# Patient Record
Sex: FEMALE | Race: WHITE | NOT HISPANIC OR LATINO | Employment: OTHER | URBAN - METROPOLITAN AREA
[De-identification: names, ages, dates, MRNs, and addresses within clinical notes are randomized per-mention and may not be internally consistent; named-entity substitution may affect disease eponyms.]

---

## 2017-04-20 ENCOUNTER — TREATMENT (OUTPATIENT)
Dept: PHYSICAL THERAPY | Facility: CLINIC | Age: 62
End: 2017-04-20

## 2017-04-20 PROCEDURE — PTSPVT PR CUSTOM PT TREATMENT OF SELF PAY PATIENT: Performed by: PHYSICAL THERAPIST

## 2019-03-19 PROBLEM — K21.9 GASTROESOPHAGEAL REFLUX DISEASE WITHOUT ESOPHAGITIS: Status: ACTIVE | Noted: 2019-03-19

## 2019-03-19 PROBLEM — J45.909 MILD ASTHMA: Status: ACTIVE | Noted: 2019-03-19

## 2019-03-19 PROBLEM — J30.81 ALLERGIC RHINITIS DUE TO ANIMAL DANDER: Status: ACTIVE | Noted: 2019-03-19

## 2019-03-19 PROBLEM — J30.9 ALLERGIC RHINITIS: Status: ACTIVE | Noted: 2019-03-19

## 2019-03-19 PROBLEM — G89.29 CHRONIC THORACIC BACK PAIN: Status: ACTIVE | Noted: 2019-03-19

## 2019-03-19 PROBLEM — E66.9 SIMPLE OBESITY: Status: ACTIVE | Noted: 2019-03-19

## 2019-03-19 PROBLEM — M54.6 CHRONIC THORACIC BACK PAIN: Status: ACTIVE | Noted: 2019-03-19

## 2019-04-01 ENCOUNTER — OFFICE VISIT (OUTPATIENT)
Dept: INTERNAL MEDICINE | Facility: CLINIC | Age: 64
End: 2019-04-01

## 2019-04-01 VITALS
SYSTOLIC BLOOD PRESSURE: 128 MMHG | DIASTOLIC BLOOD PRESSURE: 84 MMHG | HEIGHT: 64 IN | HEART RATE: 80 BPM | WEIGHT: 186 LBS | BODY MASS INDEX: 31.76 KG/M2

## 2019-04-01 DIAGNOSIS — Z13.228 SCREENING FOR ENDOCRINE, METABOLIC, AND IMMUNITY DISORDER: ICD-10-CM

## 2019-04-01 DIAGNOSIS — Z13.0 SCREENING FOR ENDOCRINE, METABOLIC, AND IMMUNITY DISORDER: ICD-10-CM

## 2019-04-01 DIAGNOSIS — K21.9 GASTROESOPHAGEAL REFLUX DISEASE WITHOUT ESOPHAGITIS: ICD-10-CM

## 2019-04-01 DIAGNOSIS — E66.9 SIMPLE OBESITY: ICD-10-CM

## 2019-04-01 DIAGNOSIS — Z13.29 SCREENING FOR ENDOCRINE, METABOLIC, AND IMMUNITY DISORDER: ICD-10-CM

## 2019-04-01 DIAGNOSIS — E78.2 MIXED HYPERLIPIDEMIA: ICD-10-CM

## 2019-04-01 DIAGNOSIS — Z00.00 PREVENTATIVE HEALTH CARE: Primary | ICD-10-CM

## 2019-04-01 DIAGNOSIS — Z13.0 SCREENING FOR IRON DEFICIENCY ANEMIA: ICD-10-CM

## 2019-04-01 DIAGNOSIS — J30.89 NON-SEASONAL ALLERGIC RHINITIS DUE TO OTHER ALLERGIC TRIGGER: ICD-10-CM

## 2019-04-01 PROCEDURE — 99396 PREV VISIT EST AGE 40-64: CPT | Performed by: INTERNAL MEDICINE

## 2019-04-01 RX ORDER — LORATADINE 10 MG/1
10 TABLET ORAL EVERY OTHER DAY
COMMUNITY

## 2019-04-01 RX ORDER — ESTRADIOL AND NORETHINDRONE ACETATE 1; .5 MG/1; MG/1
1 TABLET ORAL EVERY OTHER DAY
COMMUNITY
Start: 2019-02-12 | End: 2020-07-14

## 2019-04-01 RX ORDER — CELECOXIB 50 MG/1
1 CAPSULE ORAL DAILY
COMMUNITY
Start: 2019-02-12 | End: 2019-04-01 | Stop reason: SDUPTHER

## 2019-04-01 RX ORDER — CELECOXIB 50 MG/1
50 CAPSULE ORAL 2 TIMES DAILY
Qty: 60 CAPSULE | Refills: 11 | Status: SHIPPED | OUTPATIENT
Start: 2019-04-01 | End: 2020-04-22

## 2019-04-01 RX ORDER — PSEUDOEPHEDRINE HCL 30 MG
30 TABLET ORAL EVERY 4 HOURS PRN
COMMUNITY
End: 2022-07-18

## 2019-04-01 NOTE — PROGRESS NOTES
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  She comes in for follow-up of her asthma, allergies, hyperlipidemia, GERD, obesity and for annual examination.  She feels well and has no complaints.  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  She does not exercise otherwise, but feels her diet is healthy.  Chronic Conditions:      Patient Active Problem List   Diagnosis   • Mild asthma   • Allergic rhinitis due to animal dander   • Chronic thoracic back pain   • Gastroesophageal reflux disease without esophagitis   • Simple obesity   • Allergic rhinitis        Past Medical History:   Diagnosis Date   • Chest pain     cardiac catheterization 2006   • Colonic polyp    • Compression fx, thoracic spine (CMS/HCC) 2009    T9       Past Surgical History:   Procedure Laterality Date   • CARDIAC CATHETERIZATION  2006    Chest pain       Family History   Problem Relation Age of Onset   • Colon cancer Mother    • Hypertension Mother    • Prostate cancer Father        Social History     Socioeconomic History   • Marital status:      Spouse name: Not on file   • Number of children: Not on file   • Years of education: Not on file   • Highest education level: Not on file   Tobacco Use   • Smoking status: Never Smoker       Allergies   Allergen Reactions   • Lasix [Furosemide] Hives     Pt states she has never taken lasix   • Latex Other (See Comments)     unknown         Current Outpatient Medications:   •  celecoxib (CeleBREX) 50 MG capsule, Take 1 capsule by mouth Daily., Disp: , Rfl:   •  estradiol-norethindrone (ACTIVELLA) 1-0.5 MG per tablet, Take 1 tablet by mouth Every Other Day., Disp: , Rfl:   •  loratadine (CLARITIN) 10 MG tablet, Take 10 mg by mouth Daily., Disp: , Rfl:   •  Nutritional Supplements  "(ANTI-INFLAMMATORY ENZYME PO), Take 1 tablet by mouth Daily., Disp: , Rfl:   •  pseudoephedrine (SUDAFED) 30 MG tablet, Take 30 mg by mouth Every 4 (Four) Hours As Needed for Congestion., Disp: , Rfl:     Immunization History   Administered Date(s) Administered   • Flu Vaccine Quad PF >18YRS 10/23/2018   • Hepatitis A 10/01/2018   • Tdap 07/11/2014   • Zostavax 11/14/2016        Health Maintenance Due   Topic Date Due   • ZOSTER VACCINE (2 of 3) 01/09/2017   • HEPATITIS C SCREENING  04/20/2017   • MAMMOGRAM  04/20/2017   • PAP SMEAR  04/20/2017   • ANNUAL PHYSICAL  11/21/2018        Review of Systems   Constitutional: Negative for chills, fatigue and fever.   HENT: Negative for congestion, ear pain and sinus pressure.    Respiratory: Negative for cough, chest tightness, shortness of breath and wheezing.    Cardiovascular: Negative for chest pain and palpitations.   Gastrointestinal: Negative for abdominal pain, blood in stool and constipation.   Skin: Negative for color change.   Allergic/Immunologic: Negative for environmental allergies.   Neurological: Negative for dizziness, speech difficulty and headache.   Psychiatric/Behavioral: Negative for decreased concentration. The patient is not nervous/anxious.         Vital Signs  Vitals:    04/01/19 1054   BP: 128/84   BP Location: Right arm   Patient Position: Sitting   Cuff Size: Adult   Pulse: 80   Weight: 84.4 kg (186 lb)   Height: 161.3 cm (63.5\")       Physical Exam   Constitutional: She is oriented to person, place, and time. She appears well-developed and well-nourished. She appears overweight.   HENT:   Head: Normocephalic and atraumatic.   Right Ear: External ear normal.   Left Ear: External ear normal.   Nose: Nose normal.   Mouth/Throat: Oropharynx is clear and moist. No oropharyngeal exudate.   Eyes: Conjunctivae and EOM are normal. Pupils are equal, round, and reactive to light.   Neck: Normal range of motion. Neck supple. No JVD present. No thyromegaly " present.   Cardiovascular: Normal rate, regular rhythm, normal heart sounds and intact distal pulses. Exam reveals no gallop and no friction rub.   No murmur heard.  Pulmonary/Chest: Effort normal and breath sounds normal. No respiratory distress. She has no wheezes. She has no rales. She exhibits no tenderness.   Abdominal: Soft. Bowel sounds are normal. She exhibits no distension and no mass. There is no tenderness. There is no rebound and no guarding. No hernia.   Musculoskeletal: Normal range of motion. She exhibits no edema or tenderness.   Lymphadenopathy:     She has no cervical adenopathy.   Neurological: She is alert and oriented to person, place, and time. She displays normal reflexes. No cranial nerve deficit or sensory deficit. She exhibits normal muscle tone. Coordination normal.   Skin: Skin is warm and dry. No rash noted. No erythema.   Psychiatric: She has a normal mood and affect. Her behavior is normal. Judgment and thought content normal.   Nursing note and vitals reviewed.     Procedures      Assessment/Plan:    Preventive care, she remains in very good health with reasonably good lifestyle habits.  She is up-to-date on mammography and GYN care.  Colonoscopy done 4/12/17, follow-up at 5 years.  Myalgias and arthralgias are controlled with physical activity and Celebrex.    Obesity, BMI today is 32.4.  She is encouraged to improve diet by reducing simple carbohydrates and saturated fats, and remain active.    Allergic rhinitis, controlled on loratadine and Sudafed.    GERD, currently asymptomatic without acid reducer therapy.    Asthma, asymptomatic, controlled with treatment of allergies.    Labs  No results found for this or any previous visit.     Counseling was given to patient for the following topics: appropriate exercise 150 minutes per week, disease prevention and healthy eating habits.    Plan of care reviewed with patient at the conclusion of today's visit. Education was provided  regarding diagnosis, management, and any prescribed or recommended OTC medications.Patient verbalizes understanding of and agreement with management plan.         Daniel Ovalle MD

## 2019-12-11 ENCOUNTER — TELEPHONE (OUTPATIENT)
Dept: INTERNAL MEDICINE | Facility: CLINIC | Age: 64
End: 2019-12-11

## 2019-12-11 NOTE — TELEPHONE ENCOUNTER
Patient did not complete labs from 4/11/19  2 portal messages have been sent to remind patient   Can labs be canceled?

## 2020-04-22 RX ORDER — CELECOXIB 50 MG/1
CAPSULE ORAL
Qty: 60 CAPSULE | Refills: 1 | Status: SHIPPED | OUTPATIENT
Start: 2020-04-22 | End: 2020-08-28

## 2020-07-14 ENCOUNTER — TELEPHONE (OUTPATIENT)
Dept: INTERNAL MEDICINE | Facility: CLINIC | Age: 65
End: 2020-07-14

## 2020-07-14 ENCOUNTER — LAB (OUTPATIENT)
Dept: LAB | Facility: HOSPITAL | Age: 65
End: 2020-07-14

## 2020-07-14 ENCOUNTER — OFFICE VISIT (OUTPATIENT)
Dept: INTERNAL MEDICINE | Facility: CLINIC | Age: 65
End: 2020-07-14

## 2020-07-14 VITALS
WEIGHT: 187.2 LBS | TEMPERATURE: 97.1 F | DIASTOLIC BLOOD PRESSURE: 84 MMHG | SYSTOLIC BLOOD PRESSURE: 134 MMHG | BODY MASS INDEX: 31.96 KG/M2 | HEART RATE: 76 BPM | HEIGHT: 64 IN

## 2020-07-14 DIAGNOSIS — R53.83 OTHER FATIGUE: ICD-10-CM

## 2020-07-14 DIAGNOSIS — J45.20 MILD INTERMITTENT ASTHMA WITHOUT COMPLICATION: ICD-10-CM

## 2020-07-14 DIAGNOSIS — E78.2 MIXED HYPERLIPIDEMIA: ICD-10-CM

## 2020-07-14 DIAGNOSIS — K21.9 GASTROESOPHAGEAL REFLUX DISEASE WITHOUT ESOPHAGITIS: ICD-10-CM

## 2020-07-14 DIAGNOSIS — J30.1 SEASONAL ALLERGIC RHINITIS DUE TO POLLEN: ICD-10-CM

## 2020-07-14 DIAGNOSIS — Z00.00 PREVENTATIVE HEALTH CARE: Primary | ICD-10-CM

## 2020-07-14 LAB
ALBUMIN SERPL-MCNC: 4.2 G/DL (ref 3.5–5.2)
ALBUMIN/GLOB SERPL: 1.6 G/DL
ALP SERPL-CCNC: 88 U/L (ref 39–117)
ALT SERPL W P-5'-P-CCNC: 22 U/L (ref 1–33)
ANION GAP SERPL CALCULATED.3IONS-SCNC: 10 MMOL/L (ref 5–15)
AST SERPL-CCNC: 20 U/L (ref 1–32)
BASOPHILS # BLD AUTO: 0.04 10*3/MM3 (ref 0–0.2)
BASOPHILS NFR BLD AUTO: 0.5 % (ref 0–1.5)
BILIRUB SERPL-MCNC: 0.6 MG/DL (ref 0–1.2)
BUN SERPL-MCNC: 11 MG/DL (ref 8–23)
BUN/CREAT SERPL: 16.4 (ref 7–25)
CALCIUM SPEC-SCNC: 9.4 MG/DL (ref 8.6–10.5)
CHLORIDE SERPL-SCNC: 105 MMOL/L (ref 98–107)
CHOLEST SERPL-MCNC: 183 MG/DL (ref 0–200)
CO2 SERPL-SCNC: 27 MMOL/L (ref 22–29)
CREAT SERPL-MCNC: 0.67 MG/DL (ref 0.57–1)
DEPRECATED RDW RBC AUTO: 38.3 FL (ref 37–54)
EOSINOPHIL # BLD AUTO: 0.15 10*3/MM3 (ref 0–0.4)
EOSINOPHIL NFR BLD AUTO: 2.1 % (ref 0.3–6.2)
ERYTHROCYTE [DISTWIDTH] IN BLOOD BY AUTOMATED COUNT: 12.6 % (ref 12.3–15.4)
GFR SERPL CREATININE-BSD FRML MDRD: 88 ML/MIN/1.73
GLOBULIN UR ELPH-MCNC: 2.7 GM/DL
GLUCOSE SERPL-MCNC: 92 MG/DL (ref 65–99)
HCT VFR BLD AUTO: 41.7 % (ref 34–46.6)
HDLC SERPL-MCNC: 51 MG/DL (ref 40–60)
HGB BLD-MCNC: 14.5 G/DL (ref 12–15.9)
IMM GRANULOCYTES # BLD AUTO: 0.01 10*3/MM3 (ref 0–0.05)
IMM GRANULOCYTES NFR BLD AUTO: 0.1 % (ref 0–0.5)
LDLC SERPL CALC-MCNC: 110 MG/DL (ref 0–100)
LDLC/HDLC SERPL: 2.16 {RATIO}
LYMPHOCYTES # BLD AUTO: 1.83 10*3/MM3 (ref 0.7–3.1)
LYMPHOCYTES NFR BLD AUTO: 25.1 % (ref 19.6–45.3)
MCH RBC QN AUTO: 29.1 PG (ref 26.6–33)
MCHC RBC AUTO-ENTMCNC: 34.8 G/DL (ref 31.5–35.7)
MCV RBC AUTO: 83.6 FL (ref 79–97)
MONOCYTES # BLD AUTO: 0.5 10*3/MM3 (ref 0.1–0.9)
MONOCYTES NFR BLD AUTO: 6.9 % (ref 5–12)
NEUTROPHILS NFR BLD AUTO: 4.75 10*3/MM3 (ref 1.7–7)
NEUTROPHILS NFR BLD AUTO: 65.3 % (ref 42.7–76)
NRBC BLD AUTO-RTO: 0 /100 WBC (ref 0–0.2)
PLATELET # BLD AUTO: 271 10*3/MM3 (ref 140–450)
PMV BLD AUTO: 9.9 FL (ref 6–12)
POTASSIUM SERPL-SCNC: 4 MMOL/L (ref 3.5–5.2)
PROT SERPL-MCNC: 6.9 G/DL (ref 6–8.5)
RBC # BLD AUTO: 4.99 10*6/MM3 (ref 3.77–5.28)
SODIUM SERPL-SCNC: 142 MMOL/L (ref 136–145)
TRIGL SERPL-MCNC: 109 MG/DL (ref 0–150)
TSH SERPL DL<=0.05 MIU/L-ACNC: 1.96 UIU/ML (ref 0.27–4.2)
VLDLC SERPL-MCNC: 21.8 MG/DL (ref 5–40)
WBC # BLD AUTO: 7.28 10*3/MM3 (ref 3.4–10.8)

## 2020-07-14 PROCEDURE — 80053 COMPREHEN METABOLIC PANEL: CPT

## 2020-07-14 PROCEDURE — G0402 INITIAL PREVENTIVE EXAM: HCPCS | Performed by: INTERNAL MEDICINE

## 2020-07-14 PROCEDURE — 85025 COMPLETE CBC W/AUTO DIFF WBC: CPT

## 2020-07-14 PROCEDURE — 84443 ASSAY THYROID STIM HORMONE: CPT

## 2020-07-14 PROCEDURE — 90670 PCV13 VACCINE IM: CPT | Performed by: INTERNAL MEDICINE

## 2020-07-14 PROCEDURE — 80061 LIPID PANEL: CPT

## 2020-07-14 PROCEDURE — G0009 ADMIN PNEUMOCOCCAL VACCINE: HCPCS | Performed by: INTERNAL MEDICINE

## 2020-07-14 RX ORDER — ESTRADIOL AND NORETHINDRONE ACETATE .5; .1 MG/1; MG/1
1 TABLET ORAL EVERY OTHER DAY
COMMUNITY

## 2020-07-14 RX ORDER — ASPIRIN 81 MG/1
81 TABLET ORAL DAILY
COMMUNITY

## 2020-07-14 NOTE — PROGRESS NOTES
QUICK REFERENCE INFORMATION:  The ABCs of the Annual Wellness Visit    Welcome to Medicare Visit    HEALTH RISK ASSESSMENT    1955    Recent Hospitalizations:  No hospitalization(s) within the last year..      Current Medical Providers:  Patient Care Team:  Daniel Ovalle MD as PCP - General      Smoking Status:  Social History     Tobacco Use   Smoking Status Never Smoker       Alcohol Consumption:  Social History     Substance and Sexual Activity   Alcohol Use Not on file       Depression Screen:   PHQ-2/PHQ-9 Depression Screening 7/14/2020   Little interest or pleasure in doing things 0   Feeling down, depressed, or hopeless 1   Total Score 1       Health Habits and Functional and Cognitive Screening:  Functional & Cognitive Status 7/14/2020   Do you have difficulty preparing food and eating? No   Do you have difficulty bathing yourself, getting dressed or grooming yourself? No   Do you have difficulty using the toilet? No   Do you have difficulty moving around from place to place? No   Do you have trouble with steps or getting out of a bed or a chair? No   Current Diet Unhealthy Diet   Dental Exam Up to date   Eye Exam Not up to date   Exercise (times per week) 7 times per week   Current Exercise Activities Include (No Data)   Do you need help using the phone?  No   Are you deaf or do you have serious difficulty hearing?  No   Do you need help with transportation? No   Do you need help shopping? No   Do you need help preparing meals?  No   Do you need help with housework?  No   Do you need help with laundry? No   Do you need help taking your medications? No   Do you need help managing money? No   Do you ever drive or ride in a car without wearing a seat belt? No   Have you felt unusual stress, anger or loneliness in the last month? Yes   Who do you live with? Spouse   If you need help, do you have trouble finding someone available to you? No   Have you been bothered in the last four weeks by sexual  problems? No   Do you have difficulty concentrating, remembering or making decisions? No       Fall Risk Screen:  ARTURO Fall Risk Assessment was completed, and patient is at MODERATE risk for falls. Assessment completed on:7/14/2020    ACE III MINI        Does the patient have evidence of cognitive impairment? Yes    Aspirin use counseling? Does not need ASA (and currently is not on it)      Recent Lab Results:  CMP:     HbA1c:  No results found for: HGBA1C  Microalbumin:  No results found for: MICROALBUR, POCMALB, POCCREAT  Lipid Panel  No results found for: CHOL, TRIG, HDL, LDL, AST, ALT    Visual Acuity:   Visual Acuity Screening    Right eye Left eye Both eyes   Without correction:      With correction: 20/25 20/25 20/25       Age-appropriate Screening Schedule:  Refer to the list below for future screening recommendations based on patient's age, sex and/or medical conditions. Orders for these recommended tests are listed in the plan section. The patient has been provided with a written plan.    Health Maintenance   Topic Date Due   • ZOSTER VACCINE (2 of 3) 01/09/2017   • PAP SMEAR  04/20/2017   • LIPID PANEL  04/01/2019   • INFLUENZA VACCINE  08/01/2020   • MAMMOGRAM  03/10/2022   • TDAP/TD VACCINES (2 - Td) 07/11/2024   • COLONOSCOPY  04/12/2027        Subjective   History of Present Illness    Luma Moon is a 65 y.o. female an established patient presenting for a Welcome to Medicare Visit.     CHRONIC CONDITIONS    The following portions of the patient's history were reviewed and updated as appropriate: allergies, current medications, past family history, past medical history, past social history, past surgical history and problem list.    Outpatient Medications Prior to Visit   Medication Sig Dispense Refill   • aspirin 81 MG EC tablet Take 81 mg by mouth Daily.     • celecoxib (CeleBREX) 50 MG capsule TAKE ONE CAPSULE BY MOUTH TWICE DAILY  60 capsule 1   • cimetidine (TAGAMET) 200 MG tablet Take 200  mg by mouth 4 (Four) Times a Day.     • Estradiol-Norethindrone Acet 0.5-0.1 MG per tablet Take 1 tablet by mouth Every Other Day.     • loratadine (CLARITIN) 10 MG tablet Take 10 mg by mouth Daily.     • Methylcellulose, Laxative, (CITRUCEL PO) Take 3 tablets by mouth Daily.     • Nutritional Supplements (ANTI-INFLAMMATORY ENZYME PO) Take 1 tablet by mouth Daily.     • pseudoephedrine (SUDAFED) 30 MG tablet Take 30 mg by mouth Every 4 (Four) Hours As Needed for Congestion.     • estradiol-norethindrone (ACTIVELLA) 1-0.5 MG per tablet Take 1 tablet by mouth Every Other Day.       No facility-administered medications prior to visit.        Patient Active Problem List   Diagnosis   • Mild asthma   • Allergic rhinitis due to animal dander   • Chronic thoracic back pain   • Gastroesophageal reflux disease without esophagitis   • Simple obesity   • Allergic rhinitis       Advance Care Planning:  ACP discussion was held with the patient during this visit. Patient has an advance directive (not in EMR), copy requested.    Identification of Risk Factors:  Risk factors include: Advance Directive Discussion  Cardiovascular risk.    Review of Systems   Constitutional: Negative for chills, fatigue and fever.   HENT: Negative for congestion, ear pain and sinus pressure.    Respiratory: Negative for cough, chest tightness, shortness of breath and wheezing.    Cardiovascular: Negative for chest pain and palpitations.   Gastrointestinal: Negative for abdominal pain, blood in stool and constipation.   Skin: Negative for color change.   Allergic/Immunologic: Negative for environmental allergies.   Neurological: Negative for dizziness, speech difficulty and headaches.   Psychiatric/Behavioral: Negative for confusion. The patient is not nervous/anxious.        Compared to one year ago, the patient feels her physical health is the same.  Compared to one year ago, the patient feels her mental health is the same.    Objective    Physical  "Exam   Constitutional: She is oriented to person, place, and time. She appears well-developed and well-nourished.   HENT:   Head: Normocephalic and atraumatic.   Right Ear: External ear normal.   Left Ear: External ear normal.   Nose: Nose normal.   Mouth/Throat: Oropharynx is clear and moist. No oropharyngeal exudate.   Eyes: Pupils are equal, round, and reactive to light. Conjunctivae and EOM are normal.   Neck: Normal range of motion. Neck supple. No JVD present. No thyromegaly present.   Cardiovascular: Normal rate, regular rhythm, normal heart sounds and intact distal pulses. Exam reveals no gallop and no friction rub.   No murmur heard.  Pulmonary/Chest: Effort normal and breath sounds normal. No respiratory distress. She has no wheezes. She has no rales.   Abdominal: Soft. Bowel sounds are normal. She exhibits no distension and no mass. There is no tenderness. There is no rebound and no guarding. No hernia.   Musculoskeletal: Normal range of motion. She exhibits no edema or tenderness.   Lymphadenopathy:     She has no cervical adenopathy.   Neurological: She is alert and oriented to person, place, and time. She displays normal reflexes. No cranial nerve deficit or sensory deficit. She exhibits normal muscle tone. Coordination normal.   Skin: Skin is warm and dry. No rash noted. No erythema.   Psychiatric: She has a normal mood and affect. Her behavior is normal. Judgment and thought content normal.   Nursing note and vitals reviewed.       Vitals:    07/14/20 1029   BP: 134/84   BP Location: Left arm   Patient Position: Sitting   Cuff Size: Adult   Pulse: 76   Temp: 97.1 °F (36.2 °C)   Weight: 84.9 kg (187 lb 3.2 oz)   Height: 161.3 cm (63.5\")   PainSc: 0-No pain       Patient's Body mass index is 32.64 kg/m². BMI is above normal parameters. Recommendations include: exercise counseling and nutrition counseling.      Procedure   Procedures       Assessment/Plan   Problem List Items Addressed This Visit        " Respiratory    Mild asthma    Allergic rhinitis    Relevant Medications    celecoxib (CeleBREX) 50 MG capsule       Digestive    Gastroesophageal reflux disease without esophagitis    Relevant Medications    cimetidine (TAGAMET) 200 MG tablet      Other Visit Diagnoses     Preventative health care    -  Primary    Mixed hyperlipidemia        Relevant Orders    Comprehensive Metabolic Panel    Lipid Panel    Other fatigue        Relevant Orders    CBC & Differential    TSH        Patient Self-Management and Personalized Health Advice  The patient has been provided with information about: diet, exercise, weight management and prevention of cardiac or vascular disease and preventive services including:   · Annual Wellness Visit (AWV).    Outpatient Encounter Medications as of 7/14/2020   Medication Sig Dispense Refill   • aspirin 81 MG EC tablet Take 81 mg by mouth Daily.     • celecoxib (CeleBREX) 50 MG capsule TAKE ONE CAPSULE BY MOUTH TWICE DAILY  60 capsule 1   • cimetidine (TAGAMET) 200 MG tablet Take 200 mg by mouth 4 (Four) Times a Day.     • Estradiol-Norethindrone Acet 0.5-0.1 MG per tablet Take 1 tablet by mouth Every Other Day.     • loratadine (CLARITIN) 10 MG tablet Take 10 mg by mouth Daily.     • Methylcellulose, Laxative, (CITRUCEL PO) Take 3 tablets by mouth Daily.     • Nutritional Supplements (ANTI-INFLAMMATORY ENZYME PO) Take 1 tablet by mouth Daily.     • pseudoephedrine (SUDAFED) 30 MG tablet Take 30 mg by mouth Every 4 (Four) Hours As Needed for Congestion.     • [DISCONTINUED] estradiol-norethindrone (ACTIVELLA) 1-0.5 MG per tablet Take 1 tablet by mouth Every Other Day.       No facility-administered encounter medications on file as of 7/14/2020.      Plan    Overall she remains in very good health with good lifestyle habits.  She is up-to-date on GYN care and mammography.  Colonoscopy performed 2017, due at 5 years.  BMI today is 32, discussed the importance of weight loss through  diet.    Lipid panel is pending, continue healthy diet and regular physical exercise.    Allergies are well controlled with loratadine.    Asthma is currently asymptomatic.    GE reflux is controlled with cimetidine.    Reviewed use of high risk medication in the elderly: yes  Reviewed for potential of harmful drug interactions in the elderly: yes    Follow Up:  Return in about 1 year (around 7/14/2021) for Medicare Wellness 1 visit.     There are no Patient Instructions on file for this visit.    An After Visit Summary and PPPS with all of these plans were given to the patient.

## 2020-08-28 RX ORDER — CELECOXIB 50 MG/1
CAPSULE ORAL
Qty: 60 CAPSULE | Refills: 5 | Status: SHIPPED | OUTPATIENT
Start: 2020-08-28 | End: 2022-01-03 | Stop reason: SDUPTHER

## 2020-10-07 PROCEDURE — U0003 INFECTIOUS AGENT DETECTION BY NUCLEIC ACID (DNA OR RNA); SEVERE ACUTE RESPIRATORY SYNDROME CORONAVIRUS 2 (SARS-COV-2) (CORONAVIRUS DISEASE [COVID-19]), AMPLIFIED PROBE TECHNIQUE, MAKING USE OF HIGH THROUGHPUT TECHNOLOGIES AS DESCRIBED BY CMS-2020-01-R: HCPCS | Performed by: NURSE PRACTITIONER

## 2021-04-06 ENCOUNTER — OFFICE VISIT (OUTPATIENT)
Dept: INTERNAL MEDICINE | Facility: CLINIC | Age: 66
End: 2021-04-06

## 2021-04-06 VITALS
BODY MASS INDEX: 33.73 KG/M2 | SYSTOLIC BLOOD PRESSURE: 124 MMHG | DIASTOLIC BLOOD PRESSURE: 78 MMHG | HEIGHT: 63 IN | TEMPERATURE: 97.3 F | WEIGHT: 190.4 LBS | HEART RATE: 72 BPM

## 2021-04-06 DIAGNOSIS — M25.561 CHRONIC PAIN OF RIGHT KNEE: Primary | ICD-10-CM

## 2021-04-06 DIAGNOSIS — G89.29 CHRONIC PAIN OF RIGHT KNEE: Primary | ICD-10-CM

## 2021-04-06 DIAGNOSIS — G70.00 OCULAR MYASTHENIA GRAVIS (HCC): ICD-10-CM

## 2021-04-06 PROCEDURE — 99213 OFFICE O/P EST LOW 20 MIN: CPT | Performed by: INTERNAL MEDICINE

## 2021-04-06 NOTE — PROGRESS NOTES
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  She is done her own stretching exercises and rested.  However she continues to have discomfort and feels that she might have a partially torn lateral collateral ligament with meniscus damage.  Initially she had severe impairment and used it knee brace, a walker and then a cane to walk.  After 12 weeks she could walk without joint pain.  Currently 4 months later she is still does not feel like her gait is normal.  Also she is experiencing fatigue and her eyes, late in the day feels she needs to close her eyes and is concerned she might have ocular myasthenia gravis.    Chronic Conditions:      Patient Active Problem List   Diagnosis   • Mild asthma   • Allergic rhinitis due to animal dander   • Chronic thoracic back pain   • Gastroesophageal reflux disease without esophagitis   • Simple obesity   • Allergic rhinitis        Past Medical History:   Diagnosis Date   • Chest pain     cardiac catheterization 2006   • Colonic polyp    • Compression fx, thoracic spine (CMS/HCC) 2009    T9       Past Surgical History:   Procedure Laterality Date   • CARDIAC CATHETERIZATION  2006    Chest pain       Family History   Problem Relation Age of Onset   • Colon cancer Mother    • Hypertension Mother    • Prostate cancer Father        Social History     Socioeconomic History   • Marital status:      Spouse name: Not on file   • Number of children: Not on file   • Years of education: Not on file   • Highest education level: Not on file   Tobacco Use   • Smoking status: Never Smoker   • Smokeless tobacco: Never Used       Allergies   Allergen Reactions   • Latex Other (See Comments)     unknown          Current Outpatient Medications:   •  aspirin 81 MG EC tablet, Take 81 mg by mouth Daily., Disp: , Rfl:   •  celecoxib (CeleBREX) 50 MG capsule, TAKE ONE CAPSULE BY MOUTH TWICE DAILY , Disp: 60 capsule, Rfl: 5  •  cimetidine (TAGAMET) 200 MG tablet, Take 200 mg by mouth Daily As Needed., Disp: , Rfl:   •  Estradiol-Norethindrone Acet 0.5-0.1 MG per tablet, Take 1 tablet by mouth Every Other Day., Disp: , Rfl:   •  loratadine (CLARITIN) 10 MG tablet, Take 10 mg by mouth Every Other Day., Disp: , Rfl:   •  Methylcellulose, Laxative, (CITRUCEL PO), Take 3 tablets by mouth Daily., Disp: , Rfl:   •  Nutritional Supplements (ANTI-INFLAMMATORY ENZYME PO), Take 1 tablet by mouth Daily., Disp: , Rfl:   •  pseudoephedrine (SUDAFED) 30 MG tablet, Take 30 mg by mouth Every 4 (Four) Hours As Needed for Congestion., Disp: , Rfl:     Review of Systems   Constitutional: Negative for activity change, appetite change, diaphoresis, fatigue, unexpected weight gain and unexpected weight loss.   HENT: Negative for hearing loss.    Eyes: Negative for visual disturbance.   Respiratory: Negative for chest tightness and shortness of breath.    Cardiovascular: Negative for chest pain, palpitations and leg swelling.   Gastrointestinal: Negative for abdominal pain, blood in stool, GERD and indigestion.   Endocrine: Negative for cold intolerance and heat intolerance.   Genitourinary: Negative for dysuria and hematuria.   Musculoskeletal: Negative for arthralgias and myalgias.   Skin: Negative for skin lesions.   Allergic/Immunologic: Positive for environmental allergies.   Neurological: Negative for tremors, seizures, syncope, speech difficulty, weakness, headache, memory problem and confusion.   Hematological: Does not bruise/bleed easily.   Psychiatric/Behavioral: Negative for sleep disturbance and depressed mood. The patient is not nervous/anxious.         Vital Signs  Vitals:    04/06/21 1040   BP: 124/78   BP Location: Right arm  "  Patient Position: Sitting   Cuff Size: Adult   Pulse: 72   Temp: 97.3 °F (36.3 °C)   Weight: 86.4 kg (190 lb 6.4 oz)   Height: 158.8 cm (62.52\")   PainSc:   6   PainLoc: Knee       Physical Exam  Vitals reviewed.   Constitutional:       Appearance: She is well-developed.   HENT:      Head: Normocephalic and atraumatic.   Cardiovascular:      Rate and Rhythm: Normal rate and regular rhythm.      Heart sounds: Normal heart sounds. No murmur heard.     Pulmonary:      Effort: Pulmonary effort is normal.      Breath sounds: Normal breath sounds.   Musculoskeletal:      Comments: Knees appear normal   Neurological:      Mental Status: She is alert and oriented to person, place, and time.          Procedures    ACE III MINI             Assessment/Plan:    Diagnoses and all orders for this visit:    1. Chronic pain of right knee (Primary)    2. Ocular myasthenia gravis (CMS/HCC)  -     Ambulatory Referral to Neurology    Plan    At this point I think physical therapy is the appropriate treatment.  Only if she continues to have discomfort after several weeks of therapy without any recommend imaging.    She is referred to neurology for evaluation of possible ocular myasthenia.      Plan of care reviewed with patient at the conclusion of today's visit. Education was provided regarding diagnosis, management, and any prescribed or recommended OTC medications.Patient verbalizes understanding of and agreement with management plan.         Daniel Ovalle MD           "

## 2021-04-20 ENCOUNTER — TELEPHONE (OUTPATIENT)
Dept: INTERNAL MEDICINE | Facility: CLINIC | Age: 66
End: 2021-04-20

## 2021-04-20 DIAGNOSIS — G89.29 CHRONIC PAIN OF RIGHT KNEE: Primary | ICD-10-CM

## 2021-04-20 DIAGNOSIS — M25.561 CHRONIC PAIN OF RIGHT KNEE: Primary | ICD-10-CM

## 2021-04-20 NOTE — TELEPHONE ENCOUNTER
TAN FROM [facility] CALLED AND STATED THAT PATIENT IS THERE AT THEIR FACILITY FOR PHYSICAL THERAPY FOR HER RIGHT KNEE.    THERE WAS NO REFERRAL PUT IN.  CAN A REFERRAL BE PUT IN SINCE PATIENT IS THERE NOW.    [facility] OFFICE NUMBER [phone]    FAX NUMBER [phone]

## 2021-07-14 ENCOUNTER — OFFICE VISIT (OUTPATIENT)
Dept: INTERNAL MEDICINE | Facility: CLINIC | Age: 66
End: 2021-07-14

## 2021-07-14 VITALS
BODY MASS INDEX: 32.44 KG/M2 | HEIGHT: 64 IN | OXYGEN SATURATION: 96 % | DIASTOLIC BLOOD PRESSURE: 82 MMHG | SYSTOLIC BLOOD PRESSURE: 114 MMHG | WEIGHT: 190 LBS | HEART RATE: 77 BPM | TEMPERATURE: 97.5 F

## 2021-07-14 DIAGNOSIS — Z00.00 PREVENTATIVE HEALTH CARE: Primary | ICD-10-CM

## 2021-07-14 DIAGNOSIS — Z11.59 ENCOUNTER FOR HEPATITIS C SCREENING TEST FOR LOW RISK PATIENT: ICD-10-CM

## 2021-07-14 DIAGNOSIS — J30.1 SEASONAL ALLERGIC RHINITIS DUE TO POLLEN: ICD-10-CM

## 2021-07-14 DIAGNOSIS — J45.20 MILD INTERMITTENT ASTHMA WITHOUT COMPLICATION: ICD-10-CM

## 2021-07-14 DIAGNOSIS — E78.2 MIXED HYPERLIPIDEMIA: ICD-10-CM

## 2021-07-14 PROCEDURE — 1125F AMNT PAIN NOTED PAIN PRSNT: CPT | Performed by: INTERNAL MEDICINE

## 2021-07-14 PROCEDURE — 1170F FXNL STATUS ASSESSED: CPT | Performed by: INTERNAL MEDICINE

## 2021-07-14 PROCEDURE — 1160F RVW MEDS BY RX/DR IN RCRD: CPT | Performed by: INTERNAL MEDICINE

## 2021-07-14 PROCEDURE — 90732 PPSV23 VACC 2 YRS+ SUBQ/IM: CPT | Performed by: INTERNAL MEDICINE

## 2021-07-14 PROCEDURE — G0438 PPPS, INITIAL VISIT: HCPCS | Performed by: INTERNAL MEDICINE

## 2021-07-14 PROCEDURE — 99397 PER PM REEVAL EST PAT 65+ YR: CPT | Performed by: INTERNAL MEDICINE

## 2021-07-14 PROCEDURE — G0009 ADMIN PNEUMOCOCCAL VACCINE: HCPCS | Performed by: INTERNAL MEDICINE

## 2021-07-14 NOTE — PROGRESS NOTES
QUICK REFERENCE INFORMATION:  The ABCs of the Annual Wellness Visit    Subsequent Medicare Wellness Visit    HEALTH RISK ASSESSMENT    1955    Recent Hospitalizations:  No hospitalization(s) within the last year..        Current Medical Providers:  Patient Care Team:  Dainel Ovalle MD as PCP - General        Smoking Status:  Social History     Tobacco Use   Smoking Status Never Smoker   Smokeless Tobacco Never Used       Alcohol Consumption:  Social History     Substance and Sexual Activity   Alcohol Use Yes    Comment: very rare        Depression Screen:   PHQ-2/PHQ-9 Depression Screening 7/14/2021   Little interest or pleasure in doing things 0   Feeling down, depressed, or hopeless 0   Total Score 0       Health Habits and Functional and Cognitive Screening:  Functional & Cognitive Status 7/14/2021   Do you have difficulty preparing food and eating? No   Do you have difficulty bathing yourself, getting dressed or grooming yourself? No   Do you have difficulty using the toilet? No   Do you have difficulty moving around from place to place? No   Do you have trouble with steps or getting out of a bed or a chair? No   Current Diet Unhealthy Diet   Dental Exam Up to date   Eye Exam Up to date   Exercise (times per week) 3 times per week   Current Exercises Include Other        Exercise Comment PT Exercises    Current Exercise Activities Include -   Do you need help using the phone?  No   Are you deaf or do you have serious difficulty hearing?  No   Do you need help with transportation? No   Do you need help shopping? No   Do you need help preparing meals?  No   Do you need help with housework?  No   Do you need help with laundry? No   Do you need help taking your medications? No   Do you need help managing money? No   Do you ever drive or ride in a car without wearing a seat belt? No   Have you felt unusual stress, anger or loneliness in the last month? No   Who do you live with? Spouse   If you need  help, do you have trouble finding someone available to you? No   Have you been bothered in the last four weeks by sexual problems? No   Do you have difficulty concentrating, remembering or making decisions? No       Fall Risk Screen:  ARTURO Fall Risk Assessment was completed, and patient is at MODERATE risk for falls. Assessment completed on:7/14/2021    ACE III MINI        Does the patient have evidence of cognitive impairment? No    Aspirin use counseling: Does not need ASA (and currently is not on it)    Recent Lab Results:  CMP:  Lab Results   Component Value Date    BUN 11 07/14/2020    CREATININE 0.67 07/14/2020    EGFRIFNONA 88 07/14/2020    BCR 16.4 07/14/2020     07/14/2020    K 4.0 07/14/2020    CO2 27.0 07/14/2020    CALCIUM 9.4 07/14/2020    ALBUMIN 4.20 07/14/2020    BILITOT 0.6 07/14/2020    ALKPHOS 88 07/14/2020    AST 20 07/14/2020    ALT 22 07/14/2020     HbA1c:  No results found for: HGBA1C  Microalbumin:  No results found for: MICROALBUR, POCMALB, POCCREAT  Lipid Panel  Lab Results   Component Value Date    CHOL 183 07/14/2020    TRIG 109 07/14/2020    HDL 51 07/14/2020     (H) 07/14/2020    AST 20 07/14/2020    ALT 22 07/14/2020       Visual Acuity:  No exam data present    Age-appropriate Screening Schedule:  Refer to the list below for future screening recommendations based on patient's age, sex and/or medical conditions. Orders for these recommended tests are listed in the plan section. The patient has been provided with a written plan.    Health Maintenance   Topic Date Due   • DXA SCAN  Never done   • ZOSTER VACCINE (2 of 3) 01/09/2017   • PAP SMEAR  Never done   • LIPID PANEL  07/14/2021   • INFLUENZA VACCINE  08/01/2021   • MAMMOGRAM  02/26/2023   • TDAP/TD VACCINES (2 - Td or Tdap) 07/11/2024        Subjective   History of Present Illness    Luma Moon is a 66 y.o. female who presents for a Subsequent Wellness Visit.    CHRONIC CONDITIONS    The following portions of the  patient's history were reviewed and updated as appropriate: allergies, current medications, past family history, past medical history, past social history, past surgical history and problem list.    Outpatient Medications Prior to Visit   Medication Sig Dispense Refill   • aspirin 81 MG EC tablet Take 81 mg by mouth Daily.     • celecoxib (CeleBREX) 50 MG capsule TAKE ONE CAPSULE BY MOUTH TWICE DAILY  (Patient taking differently: 1-2 daily) 60 capsule 5   • cimetidine (TAGAMET) 200 MG tablet Take 200 mg by mouth Daily As Needed.     • Estradiol-Norethindrone Acet 0.5-0.1 MG per tablet Take 1 tablet by mouth Every Other Day.     • loratadine (CLARITIN) 10 MG tablet Take 10 mg by mouth Every Other Day.     • Methylcellulose, Laxative, (CITRUCEL PO) Take 3 tablets by mouth Daily.     • Nutritional Supplements (ANTI-INFLAMMATORY ENZYME PO) Take 1 tablet by mouth Daily.     • Nutritional Supplements (NUTRITIONAL SUPPLEMENT PO) Take  by mouth. NAD   100 mg  BID     • pseudoephedrine (SUDAFED) 30 MG tablet Take 30 mg by mouth Every 4 (Four) Hours As Needed for Congestion.     • Triamcinolone Acetonide (NASACORT ALLERGY 24HR NA) 2 sprays into the nostril(s) as directed by provider Daily.       No facility-administered medications prior to visit.       Patient Active Problem List   Diagnosis   • Mild asthma   • Allergic rhinitis due to animal dander   • Chronic thoracic back pain   • Gastroesophageal reflux disease without esophagitis   • Simple obesity   • Allergic rhinitis       Advance Care Planning:  ACP discussion was held with the patient during this visit. Patient has an advance directive in EMR which is still valid.     Identification of Risk Factors:  Risk factors include: Advance Directive Discussion.    Review of Systems   Constitutional: Negative for chills, fatigue and fever.   HENT: Negative for congestion, ear pain and sinus pressure.    Respiratory: Negative for cough, chest tightness, shortness of breath and  wheezing.    Cardiovascular: Negative for chest pain and palpitations.   Gastrointestinal: Negative for abdominal pain, blood in stool and constipation.   Skin: Negative for color change.   Allergic/Immunologic: Negative for environmental allergies.   Neurological: Negative for dizziness, speech difficulty and headaches.   Psychiatric/Behavioral: Negative for confusion. The patient is not nervous/anxious.        Compared to one year ago, the patient feels her physical health is the same.  Compared to one year ago, the patient feels her mental health is the same.    Objective     Physical Exam  Vitals and nursing note reviewed.   Constitutional:       Appearance: She is well-developed.   HENT:      Head: Normocephalic and atraumatic.      Right Ear: External ear normal.      Left Ear: External ear normal.      Nose: Nose normal.      Mouth/Throat:      Pharynx: No oropharyngeal exudate.   Eyes:      Conjunctiva/sclera: Conjunctivae normal.      Pupils: Pupils are equal, round, and reactive to light.   Neck:      Thyroid: No thyromegaly.      Vascular: No JVD.   Cardiovascular:      Rate and Rhythm: Normal rate and regular rhythm.      Heart sounds: Normal heart sounds. No murmur heard.   No friction rub. No gallop.    Pulmonary:      Effort: Pulmonary effort is normal. No respiratory distress.      Breath sounds: Normal breath sounds. No wheezing or rales.   Abdominal:      General: Bowel sounds are normal. There is no distension.      Palpations: Abdomen is soft. There is no mass.      Tenderness: There is no abdominal tenderness. There is no guarding or rebound.      Hernia: No hernia is present.   Musculoskeletal:         General: No tenderness. Normal range of motion.      Cervical back: Normal range of motion and neck supple.   Lymphadenopathy:      Cervical: No cervical adenopathy.   Skin:     General: Skin is warm and dry.      Findings: No erythema or rash.   Neurological:      Mental Status: She is alert and  "oriented to person, place, and time.      Cranial Nerves: No cranial nerve deficit.      Sensory: No sensory deficit.      Motor: No abnormal muscle tone.      Coordination: Coordination normal.      Deep Tendon Reflexes: Reflexes normal.   Psychiatric:         Behavior: Behavior normal.         Thought Content: Thought content normal.         Judgment: Judgment normal.          Procedures     Vitals:    07/14/21 1013   BP: 114/82   BP Location: Left arm   Patient Position: Sitting   Cuff Size: Adult   Pulse: 77   Temp: 97.5 °F (36.4 °C)   TempSrc: Temporal   SpO2: 96%   Weight: 86.2 kg (190 lb)   Height: 163 cm (64.17\")   PainSc:   3   PainLoc: Generalized  Comment: arthirtis       Patient's Body mass index is 32.44 kg/m². indicating that she is obese (BMI >30). Obesity-related health conditions include the following: none. Obesity is unchanged. BMI is is above average; BMI management plan is completed. We discussed portion control, increasing exercise and joining a fitness center or start home based exercise program..      Assessment/Plan   Problem List Items Addressed This Visit        Allergies and Adverse Reactions    Allergic rhinitis    Relevant Medications    celecoxib (CeleBREX) 50 MG capsule       Pulmonary and Pneumonias    Mild asthma      Other Visit Diagnoses     Preventative health care    -  Primary    Mixed hyperlipidemia        Relevant Orders    Comprehensive Metabolic Panel    Lipid Panel    Encounter for hepatitis C screening test for low risk patient        Relevant Orders    Hepatitis C antibody        Patient Self-Management and Personalized Health Advice  The patient has been provided with information about: diet, exercise and weight management and preventive services including:   · Annual Wellness Visit (AWV).    Outpatient Encounter Medications as of 7/14/2021   Medication Sig Dispense Refill   • aspirin 81 MG EC tablet Take 81 mg by mouth Daily.     • celecoxib (CeleBREX) 50 MG capsule " TAKE ONE CAPSULE BY MOUTH TWICE DAILY  (Patient taking differently: 1-2 daily) 60 capsule 5   • cimetidine (TAGAMET) 200 MG tablet Take 200 mg by mouth Daily As Needed.     • Estradiol-Norethindrone Acet 0.5-0.1 MG per tablet Take 1 tablet by mouth Every Other Day.     • loratadine (CLARITIN) 10 MG tablet Take 10 mg by mouth Every Other Day.     • Methylcellulose, Laxative, (CITRUCEL PO) Take 3 tablets by mouth Daily.     • Nutritional Supplements (ANTI-INFLAMMATORY ENZYME PO) Take 1 tablet by mouth Daily.     • Nutritional Supplements (NUTRITIONAL SUPPLEMENT PO) Take  by mouth. NAD   100 mg  BID     • pseudoephedrine (SUDAFED) 30 MG tablet Take 30 mg by mouth Every 4 (Four) Hours As Needed for Congestion.     • Triamcinolone Acetonide (NASACORT ALLERGY 24HR NA) 2 sprays into the nostril(s) as directed by provider Daily.       No facility-administered encounter medications on file as of 7/14/2021.     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  She is fully vaccinated against COVID-19.  Physical therapy was very helpful for the knee pain she had last visit.    Lipid panel is pending, the treatment remains healthy diet and avoidance of weight gain.    She will continue to follow-up with her allergist for her asthma and allergies.    Reviewed use of high risk medication in the elderly: yes  Reviewed for potential of harmful drug interactions in the elderly: yes    Follow Up:  Return in about 1 year (around 7/14/2022) for Medicare Wellness.     There are no Patient Instructions on file for this visit.    An After Visit Summary and PPPS with all of these plans were given to the patient.

## 2021-07-28 ENCOUNTER — OFFICE VISIT (OUTPATIENT)
Dept: NEUROLOGY | Facility: CLINIC | Age: 66
End: 2021-07-28

## 2021-07-28 ENCOUNTER — LAB (OUTPATIENT)
Dept: LAB | Facility: HOSPITAL | Age: 66
End: 2021-07-28

## 2021-07-28 VITALS
HEART RATE: 72 BPM | BODY MASS INDEX: 32.17 KG/M2 | HEIGHT: 64 IN | WEIGHT: 188.4 LBS | DIASTOLIC BLOOD PRESSURE: 82 MMHG | OXYGEN SATURATION: 95 % | RESPIRATION RATE: 16 BRPM | SYSTOLIC BLOOD PRESSURE: 124 MMHG

## 2021-07-28 DIAGNOSIS — R53.83 FATIGUE, UNSPECIFIED TYPE: ICD-10-CM

## 2021-07-28 DIAGNOSIS — E78.2 MIXED HYPERLIPIDEMIA: ICD-10-CM

## 2021-07-28 DIAGNOSIS — H53.2 DIPLOPIA: ICD-10-CM

## 2021-07-28 DIAGNOSIS — Z11.59 ENCOUNTER FOR HEPATITIS C SCREENING TEST FOR LOW RISK PATIENT: ICD-10-CM

## 2021-07-28 DIAGNOSIS — H53.2 DIPLOPIA: Primary | ICD-10-CM

## 2021-07-28 LAB
ALBUMIN SERPL-MCNC: 3.9 G/DL (ref 3.5–5.2)
ALBUMIN/GLOB SERPL: 1.3 G/DL
ALP SERPL-CCNC: 99 U/L (ref 39–117)
ALT SERPL W P-5'-P-CCNC: 19 U/L (ref 1–33)
ANION GAP SERPL CALCULATED.3IONS-SCNC: 9.2 MMOL/L (ref 5–15)
AST SERPL-CCNC: 23 U/L (ref 1–32)
BILIRUB SERPL-MCNC: 0.5 MG/DL (ref 0–1.2)
BUN SERPL-MCNC: 13 MG/DL (ref 8–23)
BUN/CREAT SERPL: 19.1 (ref 7–25)
CALCIUM SPEC-SCNC: 8.9 MG/DL (ref 8.6–10.5)
CHLORIDE SERPL-SCNC: 106 MMOL/L (ref 98–107)
CHOLEST SERPL-MCNC: 187 MG/DL (ref 0–200)
CO2 SERPL-SCNC: 23.8 MMOL/L (ref 22–29)
CREAT SERPL-MCNC: 0.68 MG/DL (ref 0.57–1)
GFR SERPL CREATININE-BSD FRML MDRD: 87 ML/MIN/1.73
GLOBULIN UR ELPH-MCNC: 3 GM/DL
GLUCOSE SERPL-MCNC: 90 MG/DL (ref 65–99)
HBA1C MFR BLD: 5.13 % (ref 4.8–5.6)
HCV AB SER DONR QL: NORMAL
HDLC SERPL-MCNC: 51 MG/DL (ref 40–60)
LDLC SERPL CALC-MCNC: 120 MG/DL (ref 0–100)
LDLC/HDLC SERPL: 2.32 {RATIO}
POTASSIUM SERPL-SCNC: 4.2 MMOL/L (ref 3.5–5.2)
PROT SERPL-MCNC: 6.9 G/DL (ref 6–8.5)
SODIUM SERPL-SCNC: 139 MMOL/L (ref 136–145)
TRIGL SERPL-MCNC: 89 MG/DL (ref 0–150)
VIT B12 BLD-MCNC: 205 PG/ML (ref 211–946)
VLDLC SERPL-MCNC: 16 MG/DL (ref 5–40)

## 2021-07-28 PROCEDURE — 86803 HEPATITIS C AB TEST: CPT

## 2021-07-28 PROCEDURE — 86255 FLUORESCENT ANTIBODY SCREEN: CPT

## 2021-07-28 PROCEDURE — 86038 ANTINUCLEAR ANTIBODIES: CPT

## 2021-07-28 PROCEDURE — 83519 RIA NONANTIBODY: CPT

## 2021-07-28 PROCEDURE — 99214 OFFICE O/P EST MOD 30 MIN: CPT | Performed by: PSYCHIATRY & NEUROLOGY

## 2021-07-28 PROCEDURE — 80061 LIPID PANEL: CPT

## 2021-07-28 PROCEDURE — 83036 HEMOGLOBIN GLYCOSYLATED A1C: CPT

## 2021-07-28 PROCEDURE — 82607 VITAMIN B-12: CPT

## 2021-07-28 PROCEDURE — 36415 COLL VENOUS BLD VENIPUNCTURE: CPT

## 2021-07-28 PROCEDURE — 80053 COMPREHEN METABOLIC PANEL: CPT

## 2021-07-28 NOTE — PROGRESS NOTES
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  As per patient she started having diplopia about 6 years ago.  It was initially well watching TV however over time it has progressed and she now has it even mild looking off into the distance.  When she has the diplopia she usually sees the images next to each other and it usually goes away by closing each eye.  She also complains of her eyelids feeling heavy (but no ptosis), fatigue especially after exertion and a feeling of weakness in her arms.  Denies having any difficulty chewing her food, swallowing or difficulty breathing.  She denies any family history of myasthenia but her sister did develop post Covid autoimmune symptoms.  Patient herself had symptoms of Covid including sore throat, fatigue and fever in March of last year but was never tested for it.    The following portions of the patient's history were reviewed today and updated as of 07/28/2021  : allergies, current medications, past family history, past medical history, past social history, past surgical history and problem list  These document will be scanned to patient's chart.      Current Outpatient Medications:   •  aspirin 81 MG EC tablet, Take 81 mg by mouth Daily., Disp: , Rfl:   •  celecoxib (CeleBREX) 50 MG capsule, TAKE ONE CAPSULE BY MOUTH TWICE DAILY  (Patient taking differently: 1-2 daily), Disp: 60 capsule, Rfl: 5  •  cimetidine (TAGAMET) 200 MG tablet, Take 200 mg by mouth Daily As Needed., Disp: , Rfl:   •  Estradiol-Norethindrone Acet 0.5-0.1 MG per tablet, Take 1 tablet by mouth Every Other Day., Disp: , Rfl:   •  loratadine (CLARITIN) 10 MG tablet, Take 10 mg by mouth Every Other Day., Disp: , Rfl:   •  Methylcellulose, Laxative, (CITRUCEL PO), Take  3 tablets by mouth Daily., Disp: , Rfl:   •  Nutritional Supplements (ANTI-INFLAMMATORY ENZYME PO), Take 1 tablet by mouth Daily., Disp: , Rfl:   •  Nutritional Supplements (NUTRITIONAL SUPPLEMENT PO), Take  by mouth. NAD   100 mg  BID, Disp: , Rfl:   •  pseudoephedrine (SUDAFED) 30 MG tablet, Take 30 mg by mouth Every 4 (Four) Hours As Needed for Congestion., Disp: , Rfl:   •  Triamcinolone Acetonide (NASACORT ALLERGY 24HR NA), 2 sprays into the nostril(s) as directed by provider Daily., Disp: , Rfl:    Past Medical History:   Diagnosis Date   • Allergies    • Chest pain     cardiac catheterization 2006   • Colonic polyp    • Compression fx, thoracic spine (CMS/HCC) 2009    T9   • Depression       Past Surgical History:   Procedure Laterality Date   • CARDIAC CATHETERIZATION  2006    Chest pain   • DILATATION AND CURETTAGE        Family History   Problem Relation Age of Onset   • Colon cancer Mother    • Hypertension Mother    • Kidney cancer Mother    • Prostate cancer Father    • Alcohol abuse Father    • Neuropathy Father    • Hyperlipidemia Sister    • Thyroid disease Sister    • Osteoporosis Maternal Grandmother    • Stroke Maternal Grandmother    • Stroke Maternal Grandfather    • Heart disease Paternal Grandmother    • Heart disease Paternal Grandfather       Social History     Socioeconomic History   • Marital status:      Spouse name: Not on file   • Number of children: Not on file   • Years of education: Not on file   • Highest education level: Not on file   Tobacco Use   • Smoking status: Never Smoker   • Smokeless tobacco: Never Used   Vaping Use   • Vaping Use: Never used   Substance and Sexual Activity   • Alcohol use: Not Currently     Comment: Former - Quit 7 years ago   • Drug use: Never   • Sexual activity: Defer     Review of Systems   Constitutional: Positive for fatigue.   Eyes: Positive for double vision.   All other systems reviewed and are negative.      Objective:    /82    "Pulse 72   Resp 16   Ht 163 cm (64.17\")   Wt 85.5 kg (188 lb 6.4 oz)   SpO2 95%   BMI 32.17 kg/m²     Neurology Exam:    General apperance: NAD.     Mental status: Alert, awake and oriented to time place and person.    Recent and Remote memory: Intact.    Attention span and Concentration: Normal.     Language and Speech: Intact- No dysarthria.    Fluency, Naming , Repitition and Comprehension:  Intact    Cranial Nerves:   CN II: Visual fields are full. Intact. Fundi - Normal, No papillederma, Pupils - VANDA  CN III, IV and VI: Extraocular movements are intact. Normal saccades.  Patient had horizontal binocular diplopia on looking to the right and blurred vision with upward gaze.  No ptosis noted  CN V: Facial sensation is intact.   CN VII: Muscles of facial expression reveal no asymmetry. Intact.   CN VIII: Hearing is intact. Whispered voice intact.   CN IX and X: Palate elevates symmetrically. Intact  CN XI: Shoulder shrug is intact.   CN XII: Tongue is midline without evidence of atrophy or fasciculation.     Ophthalmoscopic exam of optic disc-normal    Motor:  Right UE muscle strength 5/5. Normal tone.     Left UE muscle strength 5/5. Normal tone.      Right LE muscle strength5/5. Normal tone.     Left LE muscle strength 5/5. Normal tone.      Sensory: Normal light touch, vibration and pinprick sensation bilaterally.    DTRs: 2+ bilaterally in upper and lower extremities.    Babinski: Negative bilaterally.    Co-ordination: Normal finger-to-nose, heel to shin B/L.    Rhomberg: Negative.    Gait: Normal.    Cardiovascular: Regular rate and rhythm without murmur, gallop or rub.    Assessment and Plan:  1. Diplopia  Patient has had gradually progressive diplopia  along with fatigue  I will check a myasthenia panel and A1c as well as a MRI brain to rule out any ischemic lesions  If myasthenia panel negative I will get a repetitive nerve stimulation test    - MRI Brain Without Contrast; Future  - Myasthenia " Gravis Full Panel; Future  - Musk Antibody; Future  - Hemoglobin A1c; Future    2. Fatigue, unspecified type  Will also check for MICKI and B12 level     - MICKI by IFA, Reflex 9-biomarkers profile; Future  - Vitamin B12; Future  - Hemoglobin A1c; Future       Return in about 6 weeks (around 9/8/2021).         Lula Sagastume MD

## 2021-07-29 LAB
ANA TITR SER IF: NEGATIVE {TITER}
LABORATORY COMMENT REPORT: NORMAL

## 2021-08-02 ENCOUNTER — HOSPITAL ENCOUNTER (OUTPATIENT)
Dept: MRI IMAGING | Facility: HOSPITAL | Age: 66
Discharge: HOME | End: 2021-08-02
Admitting: PSYCHIATRY & NEUROLOGY

## 2021-08-02 DIAGNOSIS — H53.2 DIPLOPIA: ICD-10-CM

## 2021-08-02 LAB — MUSK AB SER-SCNC: <1 U/ML

## 2021-08-02 PROCEDURE — 70551 MRI BRAIN STEM W/O DYE: CPT

## 2021-08-05 LAB
ACHR BIND AB SER-SCNC: 0.11 NMOL/L (ref 0–0.24)
ACHR BLOCK AB SER-ACNC: 21 % (ref 0–25)
ACHR MOD AB/ACHR TOTAL SFR SER: <12 % (ref 0–20)
REFLEX INFORMATION: NORMAL
STRIA MUS AB TITR SER IF: NEGATIVE {TITER}

## 2021-08-06 ENCOUNTER — TELEPHONE (OUTPATIENT)
Dept: NEUROLOGY | Facility: CLINIC | Age: 66
End: 2021-08-06

## 2021-08-06 DIAGNOSIS — H53.2 DIPLOPIA: Primary | ICD-10-CM

## 2021-08-06 NOTE — TELEPHONE ENCOUNTER
----- Message from Lula Sagastume MD sent at 8/6/2021  9:29 AM EDT -----  Please let her know that her MRI brain is normal.  It just shows mild age-related changes.  Also her myasthenia antibody panel is normal but her vitamin B12 level is extremely low.  She should speak to her PCP about getting vitamin B12 injections once a week for 4 weeks then once a month for 6 months.  Also I will be ordering a test called as repetitive nerve stimulation test which is done to confirm myasthenia gravis.

## 2021-09-08 ENCOUNTER — OFFICE VISIT (OUTPATIENT)
Dept: NEUROLOGY | Facility: CLINIC | Age: 66
End: 2021-09-08

## 2021-09-08 VITALS
WEIGHT: 186 LBS | SYSTOLIC BLOOD PRESSURE: 118 MMHG | DIASTOLIC BLOOD PRESSURE: 70 MMHG | HEART RATE: 92 BPM | OXYGEN SATURATION: 96 % | BODY MASS INDEX: 31.76 KG/M2 | HEIGHT: 64 IN

## 2021-09-08 DIAGNOSIS — H53.2 DIPLOPIA: Primary | ICD-10-CM

## 2021-09-08 PROCEDURE — 99213 OFFICE O/P EST LOW 20 MIN: CPT | Performed by: PSYCHIATRY & NEUROLOGY

## 2021-09-08 NOTE — PROGRESS NOTES
Subjective:    CC: Luma Moon is seen today  for Diplopia       HPI:  Current visit-patient states that she continues to have horizontal diplopia especially when she is tired.  Her myasthenia panel and MuSK antibody were negative.  B12 level was extremely low at 205 and she is now taking supplements.  A1c was 5.1.  LDL was 120, MICKI negative.  She also had her MRI brain that showed minor chronic ischemic changes but no acute intracranial abnormalities.  She has a repetitive nerve stimulation scheduled for later this month.  She also saw Dr. Joshi from ophthalmology at  who diagnosed her with divergence insufficiency with esotropia especially with far lateral gaze.  She did an ice test which did not improve her with esotropia.  Has given her a prescription of new lenses with prisms that the patient is yet to get.  Of note I personally reviewed her MRI brain and the ophthalmology notes from .    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  As per patient she started having diplopia about 6 years ago.  It was initially well watching TV however over time it has progressed and she now has it even mild looking off into the distance.  When she has the diplopia she usually sees the images next to each other and it usually goes away by closing each eye.  She also complains of her eyelids feeling heavy (but no ptosis), fatigue especially after exertion and a feeling of weakness in her arms.  Denies having any difficulty chewing her food, swallowing or difficulty breathing.  She denies any family history of myasthenia but her sister did develop post Covid autoimmune symptoms.  Patient herself had symptoms of Covid including sore throat, fatigue and fever in March of last year but was never tested for it.    The following portions of the patient's history were reviewed today and updated as of 07/28/2021  : allergies,  current medications, past family history, past medical history, past social history, past surgical history and problem list  These document will be scanned to patient's chart.      Current Outpatient Medications:   •  aspirin 81 MG EC tablet, Take 81 mg by mouth Daily., Disp: , Rfl:   •  celecoxib (CeleBREX) 50 MG capsule, TAKE ONE CAPSULE BY MOUTH TWICE DAILY  (Patient taking differently: 1-2 daily), Disp: 60 capsule, Rfl: 5  •  cimetidine (TAGAMET) 200 MG tablet, Take 200 mg by mouth Daily As Needed., Disp: , Rfl:   •  Estradiol-Norethindrone Acet 0.5-0.1 MG per tablet, Take 1 tablet by mouth Every Other Day., Disp: , Rfl:   •  loratadine (CLARITIN) 10 MG tablet, Take 10 mg by mouth Every Other Day., Disp: , Rfl:   •  Methylcellulose, Laxative, (CITRUCEL PO), Take 3 tablets by mouth Daily., Disp: , Rfl:   •  Nutritional Supplements (ANTI-INFLAMMATORY ENZYME PO), Take 1 tablet by mouth Daily., Disp: , Rfl:   •  Nutritional Supplements (NUTRITIONAL SUPPLEMENT PO), Take  by mouth. NAD   100 mg  BID, Disp: , Rfl:   •  pseudoephedrine (SUDAFED) 30 MG tablet, Take 30 mg by mouth Every 4 (Four) Hours As Needed for Congestion., Disp: , Rfl:    Past Medical History:   Diagnosis Date   • Allergies    • Chest pain     cardiac catheterization 2006   • Colonic polyp    • Compression fx, thoracic spine (CMS/HCC) 2009    T9   • Depression       Past Surgical History:   Procedure Laterality Date   • CARDIAC CATHETERIZATION  2006    Chest pain   • DILATATION AND CURETTAGE        Family History   Problem Relation Age of Onset   • Colon cancer Mother    • Hypertension Mother    • Kidney cancer Mother    • Prostate cancer Father    • Alcohol abuse Father    • Neuropathy Father    • Hyperlipidemia Sister    • Thyroid disease Sister    • Osteoporosis Maternal Grandmother    • Stroke Maternal Grandmother    • Stroke Maternal Grandfather    • Heart disease Paternal Grandmother    • Heart disease Paternal Grandfather       Social History  "    Socioeconomic History   • Marital status:      Spouse name: Not on file   • Number of children: Not on file   • Years of education: Not on file   • Highest education level: Not on file   Tobacco Use   • Smoking status: Never Smoker   • Smokeless tobacco: Never Used   Vaping Use   • Vaping Use: Never used   Substance and Sexual Activity   • Alcohol use: Not Currently     Comment: Former - Quit 7 years ago   • Drug use: Never   • Sexual activity: Defer     Review of Systems   Constitutional: Positive for fatigue.   Eyes: Positive for double vision.   All other systems reviewed and are negative.      Objective:    /70   Pulse 92   Ht 163 cm (64.17\")   Wt 84.4 kg (186 lb)   SpO2 96%   BMI 31.76 kg/m²     Neurology Exam:    General apperance: NAD.     Mental status: Alert, awake and oriented to time place and person.    Recent and Remote memory: Intact.    Attention span and Concentration: Normal.     Language and Speech: Intact- No dysarthria.    Fluency, Naming , Repitition and Comprehension:  Intact    Cranial Nerves:   CN II: Visual fields are full. Intact. Fundi - Normal, No papillederma, Pupils - VANDA  CN III, IV and VI: Extraocular movements are intact. Normal saccades.  Diplopia on upward gaze.  At her last visit-patient had horizontal binocular diplopia on looking to the right and blurred vision with upward gaze.  No ptosis noted  CN V: Facial sensation is intact.   CN VII: Muscles of facial expression reveal no asymmetry. Intact.   CN VIII: Hearing is intact. Whispered voice intact.   CN IX and X: Palate elevates symmetrically. Intact  CN XI: Shoulder shrug is intact.   CN XII: Tongue is midline without evidence of atrophy or fasciculation.     Ophthalmoscopic exam of optic disc-normal    Motor:  Right UE muscle strength 5/5. Normal tone.     Left UE muscle strength 5/5. Normal tone.      Right LE muscle strength5/5. Normal tone.     Left LE muscle strength 5/5. Normal tone.      Sensory: " Normal light touch, vibration and pinprick sensation bilaterally.    DTRs: 2+ bilaterally in upper and lower extremities.    Babinski: Negative bilaterally.    Co-ordination: Normal finger-to-nose, heel to shin B/L.    Rhomberg: Negative.    Gait: Normal.    Cardiovascular: Regular rate and rhythm without murmur, gallop or rub.    Assessment and Plan:  1. Diplopia  Patient has had gradually progressive diplopia  along with fatigue.  It could be due to possible myasthenia versus divergence insufficiency (as per ophthalmology)  MRI brain was unremarkable.  Myasthenia antibody panel was also negative  She is awaiting a repetitive nerve stimulation test  Has been given new glasses with prisms that she is yet to get         Return in about 4 months (around 1/8/2022).         Lula Sagastume MD

## 2021-09-22 ENCOUNTER — HOSPITAL ENCOUNTER (OUTPATIENT)
Dept: NEUROLOGY | Facility: HOSPITAL | Age: 66
Discharge: HOME | End: 2021-09-22
Admitting: PSYCHIATRY & NEUROLOGY

## 2021-09-22 DIAGNOSIS — H53.2 DIPLOPIA: ICD-10-CM

## 2021-09-22 PROCEDURE — 95937 NEUROMUSCULAR JUNCTION TEST: CPT

## 2021-09-22 PROCEDURE — 95908 NRV CNDJ TST 3-4 STUDIES: CPT

## 2021-09-22 PROCEDURE — 95886 MUSC TEST DONE W/N TEST COMP: CPT

## 2021-09-23 ENCOUNTER — TELEPHONE (OUTPATIENT)
Dept: NEUROLOGY | Facility: CLINIC | Age: 66
End: 2021-09-23

## 2021-09-23 NOTE — TELEPHONE ENCOUNTER
----- Message from Lula Sagastume MD sent at 9/22/2021  4:51 PM EDT -----  Please let the patient know that her repetitive nerve conduction test was normal

## 2021-10-15 ENCOUNTER — TELEPHONE (OUTPATIENT)
Dept: INTERNAL MEDICINE | Facility: CLINIC | Age: 66
End: 2021-10-15

## 2021-10-15 RX ORDER — AMOXICILLIN AND CLAVULANATE POTASSIUM 875; 125 MG/1; MG/1
1 TABLET, FILM COATED ORAL 2 TIMES DAILY
Qty: 14 TABLET | Refills: 0 | Status: SHIPPED | OUTPATIENT
Start: 2021-10-15 | End: 2022-03-16

## 2021-10-15 NOTE — TELEPHONE ENCOUNTER
Caller: Luma Moon    Relationship: Self    Best call back number: [phone]    What medication are you requesting:  CLAVIMOX, OR WHAT DR HNESON RECOMMENDS    What are your current symptoms: BIT BY MY CAT, NOW SOME PUSS AND SWELLING    How long have you been experiencing symptoms: 1 DAY    Have you had these symptoms before:    [] Yes  [x] No    Have you been treated for these symptoms before:   [] Yes  [x] No    If a prescription is needed, what is your preferred pharmacy and phone number:   [facility] - [place], [place] - [address] [place] [address] - [phone] [facility] [phone]   [phone]

## 2022-03-16 ENCOUNTER — OFFICE VISIT (OUTPATIENT)
Dept: NEUROLOGY | Facility: CLINIC | Age: 67
End: 2022-03-16

## 2022-03-16 VITALS
BODY MASS INDEX: 34.41 KG/M2 | HEART RATE: 77 BPM | OXYGEN SATURATION: 97 % | HEIGHT: 62 IN | WEIGHT: 187 LBS | SYSTOLIC BLOOD PRESSURE: 110 MMHG | DIASTOLIC BLOOD PRESSURE: 64 MMHG

## 2022-03-16 DIAGNOSIS — H53.2 DIPLOPIA: Primary | ICD-10-CM

## 2022-03-16 PROCEDURE — 99212 OFFICE O/P EST SF 10 MIN: CPT | Performed by: PSYCHIATRY & NEUROLOGY

## 2022-03-16 NOTE — PROGRESS NOTES
Subjective:    CC: Luma Moon is seen today  for diplopia    HPI:  Current visit-patient had her repetitive nerve stimulation test which was normal.  She saw her ophthalmologist and got fitted for new lenses with prisms that she has started wearing now.  Her double vision has completely resolved since doing so.  With regards to her B12 deficiency she has been taking oral supplements.     Last visit-patient states that she continues to have horizontal diplopia especially when she is tired.  Her myasthenia panel and MuSK antibody were negative.  B12 level was extremely low at 205 and she is now taking supplements.  A1c was 5.1.  LDL was 120, MICKI negative.  She also had her MRI brain that showed minor chronic ischemic changes but no acute intracranial abnormalities.  She has a repetitive nerve stimulation scheduled for later this month.  She also saw Dr. Joshi from ophthalmology at  who diagnosed her with divergence insufficiency with esotropia especially with far lateral gaze.  She did an ice test which did not improve her with esotropia.  Has given her a prescription of new lenses with prisms that the patient is yet to get.  Of note I personally reviewed her MRI brain and the ophthalmology notes from .    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  As per patient she started having diplopia about 6 years ago.  It was initially well watching TV however over time it has progressed and she now has it even mild looking off into the distance.  When she has the diplopia she usually sees the images next to each other and it usually goes away by closing each eye.  She also complains of her eyelids feeling heavy (but no ptosis), fatigue especially after exertion and a feeling of weakness in her arms.  Denies having any difficulty chewing her food, swallowing or difficulty breathing.  She denies any family history of  myasthenia but her sister did develop post Covid autoimmune symptoms.  Patient herself had symptoms of Covid including sore throat, fatigue and fever in March of last year but was never tested for it.    The following portions of the patient's history were reviewed today and updated as of 07/28/2021  : allergies, current medications, past family history, past medical history, past social history, past surgical history and problem list  These document will be scanned to patient's chart.      Current Outpatient Medications:   •  aspirin 81 MG EC tablet, Take 81 mg by mouth Daily., Disp: , Rfl:   •  celecoxib (CeleBREX) 50 MG capsule, Take 1 capsule by mouth 2 (Two) Times a Day., Disp: 180 capsule, Rfl: 3  •  cimetidine (TAGAMET) 200 MG tablet, Take 200 mg by mouth Daily As Needed., Disp: , Rfl:   •  Estradiol-Norethindrone Acet 0.5-0.1 MG per tablet, Take 1 tablet by mouth Every Other Day., Disp: , Rfl:   •  loratadine (CLARITIN) 10 MG tablet, Take 10 mg by mouth Every Other Day., Disp: , Rfl:   •  Methylcellulose, Laxative, (CITRUCEL PO), Take 3 tablets by mouth Daily., Disp: , Rfl:   •  Nutritional Supplements (ANTI-INFLAMMATORY ENZYME PO), Take 1 tablet by mouth Daily., Disp: , Rfl:   •  Nutritional Supplements (NUTRITIONAL SUPPLEMENT PO), Take  by mouth. NAD   100 mg  BID, Disp: , Rfl:   •  pseudoephedrine (SUDAFED) 30 MG tablet, Take 30 mg by mouth Every 4 (Four) Hours As Needed for Congestion., Disp: , Rfl:    Past Medical History:   Diagnosis Date   • Allergies    • Chest pain     cardiac catheterization 2006   • Colonic polyp    • Compression fx, thoracic spine (HCC) 2009    T9   • Depression       Past Surgical History:   Procedure Laterality Date   • CARDIAC CATHETERIZATION  2006    Chest pain   • DILATATION AND CURETTAGE        Family History   Problem Relation Age of Onset   • Colon cancer Mother    • Hypertension Mother    • Kidney cancer Mother    • Prostate cancer Father    • Alcohol abuse Father    •  "Neuropathy Father    • Hyperlipidemia Sister    • Thyroid disease Sister    • Osteoporosis Maternal Grandmother    • Stroke Maternal Grandmother    • Stroke Maternal Grandfather    • Heart disease Paternal Grandmother    • Heart disease Paternal Grandfather       Social History     Socioeconomic History   • Marital status:    Tobacco Use   • Smoking status: Never Smoker   • Smokeless tobacco: Never Used   Vaping Use   • Vaping Use: Never used   Substance and Sexual Activity   • Alcohol use: Not Currently     Comment: Former - Quit 7 years ago   • Drug use: Never   • Sexual activity: Defer     Review of Systems   All other systems reviewed and are negative.      Objective:    /64   Pulse 77   Ht 157.5 cm (62\")   Wt 84.8 kg (187 lb)   SpO2 97%   BMI 34.20 kg/m²     Neurology Exam:    General apperance: NAD.     Mental status: Alert, awake and oriented to time place and person.    Recent and Remote memory: Intact.    Attention span and Concentration: Normal.     Language and Speech: Intact- No dysarthria.    Fluency, Naming , Repitition and Comprehension:  Intact    Cranial Nerves:   CN II: Visual fields are full. Intact. Fundi - Normal, No papillederma, Pupils - VANDA  CN III, IV and VI: Extraocular movements are intact. Normal saccades.  No diplopia noted  CN V: Facial sensation is intact.   CN VII: Muscles of facial expression reveal no asymmetry. Intact.   CN VIII: Hearing is intact. Whispered voice intact.   CN IX and X: Palate elevates symmetrically. Intact  CN XI: Shoulder shrug is intact.   CN XII: Tongue is midline without evidence of atrophy or fasciculation.     Ophthalmoscopic exam of optic disc-normal    Motor:  Right UE muscle strength 5/5. Normal tone.     Left UE muscle strength 5/5. Normal tone.      Right LE muscle strength5/5. Normal tone.     Left LE muscle strength 5/5. Normal tone.      Sensory: Normal light touch, vibration and pinprick sensation bilaterally.    DTRs: 2+ " bilaterally in upper and lower extremities.    Babinski: Negative bilaterally.    Co-ordination: Normal finger-to-nose, heel to shin B/L.    Rhomberg: Negative.    Gait: Normal.    Cardiovascular: Regular rate and rhythm without murmur, gallop or rub.    Assessment and Plan:  1. Diplopia  Her diplopia was most likely due to divergence insufficiency (as per ophthalmology)  MRI brain was unremarkable.  Myasthenia antibody panel as well as repetitive nerve stimulation was also negative  She has been using new lenses with prisms  She should continue vitamin B12 supplements and have her levels checked within a few months       Return if symptoms worsen or fail to improve.         Lula Sagastume MD

## 2022-07-11 DIAGNOSIS — R73.09 ABNORMAL GLUCOSE: ICD-10-CM

## 2022-07-11 DIAGNOSIS — E78.2 MIXED HYPERLIPIDEMIA: Primary | ICD-10-CM

## 2022-07-11 DIAGNOSIS — E53.8 B12 DEFICIENCY: ICD-10-CM

## 2022-07-14 ENCOUNTER — LAB (OUTPATIENT)
Dept: LAB | Facility: HOSPITAL | Age: 67
End: 2022-07-14

## 2022-07-14 DIAGNOSIS — E78.2 MIXED HYPERLIPIDEMIA: ICD-10-CM

## 2022-07-14 DIAGNOSIS — R73.09 ABNORMAL GLUCOSE: ICD-10-CM

## 2022-07-14 DIAGNOSIS — E53.8 B12 DEFICIENCY: ICD-10-CM

## 2022-07-14 LAB
ALBUMIN SERPL-MCNC: 4.4 G/DL (ref 3.5–5.2)
ALBUMIN/GLOB SERPL: 1.8 G/DL
ALP SERPL-CCNC: 98 U/L (ref 39–117)
ALT SERPL W P-5'-P-CCNC: 13 U/L (ref 1–33)
ANION GAP SERPL CALCULATED.3IONS-SCNC: 11.4 MMOL/L (ref 5–15)
AST SERPL-CCNC: 21 U/L (ref 1–32)
BASOPHILS # BLD AUTO: 0.04 10*3/MM3 (ref 0–0.2)
BASOPHILS NFR BLD AUTO: 0.6 % (ref 0–1.5)
BILIRUB SERPL-MCNC: 0.6 MG/DL (ref 0–1.2)
BUN SERPL-MCNC: 14 MG/DL (ref 8–23)
BUN/CREAT SERPL: 22.2 (ref 7–25)
CALCIUM SPEC-SCNC: 9.3 MG/DL (ref 8.6–10.5)
CHLORIDE SERPL-SCNC: 106 MMOL/L (ref 98–107)
CHOLEST SERPL-MCNC: 187 MG/DL (ref 0–200)
CO2 SERPL-SCNC: 24.6 MMOL/L (ref 22–29)
CREAT SERPL-MCNC: 0.63 MG/DL (ref 0.57–1)
DEPRECATED RDW RBC AUTO: 37 FL (ref 37–54)
EGFRCR SERPLBLD CKD-EPI 2021: 97.4 ML/MIN/1.73
EOSINOPHIL # BLD AUTO: 0.15 10*3/MM3 (ref 0–0.4)
EOSINOPHIL NFR BLD AUTO: 2.2 % (ref 0.3–6.2)
ERYTHROCYTE [DISTWIDTH] IN BLOOD BY AUTOMATED COUNT: 12.4 % (ref 12.3–15.4)
GLOBULIN UR ELPH-MCNC: 2.4 GM/DL
GLUCOSE SERPL-MCNC: 81 MG/DL (ref 65–99)
HBA1C MFR BLD: 5.5 % (ref 4.8–5.6)
HCT VFR BLD AUTO: 42.9 % (ref 34–46.6)
HDLC SERPL-MCNC: 59 MG/DL (ref 40–60)
HGB BLD-MCNC: 14.8 G/DL (ref 12–15.9)
IMM GRANULOCYTES # BLD AUTO: 0.01 10*3/MM3 (ref 0–0.05)
IMM GRANULOCYTES NFR BLD AUTO: 0.1 % (ref 0–0.5)
LDLC SERPL CALC-MCNC: 115 MG/DL (ref 0–100)
LDLC/HDLC SERPL: 1.93 {RATIO}
LYMPHOCYTES # BLD AUTO: 1.54 10*3/MM3 (ref 0.7–3.1)
LYMPHOCYTES NFR BLD AUTO: 22.3 % (ref 19.6–45.3)
MCH RBC QN AUTO: 28.6 PG (ref 26.6–33)
MCHC RBC AUTO-ENTMCNC: 34.5 G/DL (ref 31.5–35.7)
MCV RBC AUTO: 82.8 FL (ref 79–97)
MONOCYTES # BLD AUTO: 0.51 10*3/MM3 (ref 0.1–0.9)
MONOCYTES NFR BLD AUTO: 7.4 % (ref 5–12)
NEUTROPHILS NFR BLD AUTO: 4.66 10*3/MM3 (ref 1.7–7)
NEUTROPHILS NFR BLD AUTO: 67.4 % (ref 42.7–76)
NRBC BLD AUTO-RTO: 0 /100 WBC (ref 0–0.2)
PLATELET # BLD AUTO: 265 10*3/MM3 (ref 140–450)
PMV BLD AUTO: 9.9 FL (ref 6–12)
POTASSIUM SERPL-SCNC: 4.4 MMOL/L (ref 3.5–5.2)
PROT SERPL-MCNC: 6.8 G/DL (ref 6–8.5)
RBC # BLD AUTO: 5.18 10*6/MM3 (ref 3.77–5.28)
SODIUM SERPL-SCNC: 142 MMOL/L (ref 136–145)
TRIGL SERPL-MCNC: 72 MG/DL (ref 0–150)
VIT B12 BLD-MCNC: 1244 PG/ML (ref 211–946)
VLDLC SERPL-MCNC: 13 MG/DL (ref 5–40)
WBC NRBC COR # BLD: 6.91 10*3/MM3 (ref 3.4–10.8)

## 2022-07-14 PROCEDURE — 80061 LIPID PANEL: CPT

## 2022-07-14 PROCEDURE — 83036 HEMOGLOBIN GLYCOSYLATED A1C: CPT

## 2022-07-14 PROCEDURE — 85025 COMPLETE CBC W/AUTO DIFF WBC: CPT

## 2022-07-14 PROCEDURE — 82607 VITAMIN B-12: CPT

## 2022-07-14 PROCEDURE — 80053 COMPREHEN METABOLIC PANEL: CPT

## 2022-07-18 ENCOUNTER — OFFICE VISIT (OUTPATIENT)
Dept: INTERNAL MEDICINE | Facility: CLINIC | Age: 67
End: 2022-07-18

## 2022-07-18 VITALS
WEIGHT: 187 LBS | SYSTOLIC BLOOD PRESSURE: 112 MMHG | HEIGHT: 64 IN | BODY MASS INDEX: 31.92 KG/M2 | DIASTOLIC BLOOD PRESSURE: 64 MMHG | HEART RATE: 84 BPM | TEMPERATURE: 98.4 F

## 2022-07-18 DIAGNOSIS — J30.1 SEASONAL ALLERGIC RHINITIS DUE TO POLLEN: ICD-10-CM

## 2022-07-18 DIAGNOSIS — K21.9 GASTROESOPHAGEAL REFLUX DISEASE WITHOUT ESOPHAGITIS: ICD-10-CM

## 2022-07-18 DIAGNOSIS — Z00.00 PREVENTATIVE HEALTH CARE: Primary | ICD-10-CM

## 2022-07-18 DIAGNOSIS — M54.6 CHRONIC MIDLINE THORACIC BACK PAIN: ICD-10-CM

## 2022-07-18 DIAGNOSIS — G89.29 CHRONIC MIDLINE THORACIC BACK PAIN: ICD-10-CM

## 2022-07-18 PROCEDURE — 1125F AMNT PAIN NOTED PAIN PRSNT: CPT | Performed by: INTERNAL MEDICINE

## 2022-07-18 PROCEDURE — G0439 PPPS, SUBSEQ VISIT: HCPCS | Performed by: INTERNAL MEDICINE

## 2022-07-18 PROCEDURE — 1170F FXNL STATUS ASSESSED: CPT | Performed by: INTERNAL MEDICINE

## 2022-07-18 PROCEDURE — 1159F MED LIST DOCD IN RCRD: CPT | Performed by: INTERNAL MEDICINE

## 2022-07-18 PROCEDURE — 99397 PER PM REEVAL EST PAT 65+ YR: CPT | Performed by: INTERNAL MEDICINE

## 2022-07-18 RX ORDER — PSEUDOEPHEDRINE HCL 120 MG/1
60 TABLET, FILM COATED, EXTENDED RELEASE ORAL DAILY
COMMUNITY
Start: 2022-06-13

## 2022-07-18 NOTE — PROGRESS NOTES
QUICK REFERENCE INFORMATION:  The ABCs of the Annual Wellness Visit    Subsequent Medicare Wellness Visit    HEALTH RISK ASSESSMENT    1955    Recent Hospitalizations:  No hospitalization(s) within the last year..        Current Medical Providers:  Patient Care Team:  Daniel Ovalle MD as PCP - General        Smoking Status:  Social History     Tobacco Use   Smoking Status Never Smoker   Smokeless Tobacco Never Used       Alcohol Consumption:  Social History     Substance and Sexual Activity   Alcohol Use Not Currently    Comment: Former - Quit 7 years ago       Depression Screen:   PHQ-2/PHQ-9 Depression Screening 7/18/2022   Retired PHQ-9 Total Score -   Retired Total Score -   Little Interest or Pleasure in Doing Things 0-->not at all   Feeling Down, Depressed or Hopeless 0-->not at all   PHQ-9: Brief Depression Severity Measure Score 0       Health Habits and Functional and Cognitive Screening:  Functional & Cognitive Status 7/18/2022   Do you have difficulty preparing food and eating? No   Do you have difficulty bathing yourself, getting dressed or grooming yourself? No   Do you have difficulty using the toilet? No   Do you have difficulty moving around from place to place? No   Do you have trouble with steps or getting out of a bed or a chair? No   Current Diet Unhealthy Diet   Dental Exam Not up to date   Eye Exam Not up to date   Exercise (times per week) 7 times per week   Current Exercises Include Walking        Exercise Comment -   Current Exercise Activities Include -   Do you need help using the phone?  No   Are you deaf or do you have serious difficulty hearing?  No   Do you need help with transportation? No   Do you need help shopping? No   Do you need help preparing meals?  No   Do you need help with housework?  No   Do you need help with laundry? No   Do you need help taking your medications? No   Do you need help managing money? No   Do you ever drive or ride in a car without wearing a  seat belt? No   Have you felt unusual stress, anger or loneliness in the last month? No   Who do you live with? Spouse   If you need help, do you have trouble finding someone available to you? No   Have you been bothered in the last four weeks by sexual problems? No   Do you have difficulty concentrating, remembering or making decisions? No       Fall Risk Screen:  ARTURO Fall Risk Assessment was completed, and patient is at LOW risk for falls.Assessment completed on:7/18/2022    ACE III MINI        Does the patient have evidence of cognitive impairment? No    Aspirin use counseling: Taking ASA appropriately as indicated    Recent Lab Results:  CMP:  Lab Results   Component Value Date    BUN 14 07/14/2022    CREATININE 0.63 07/14/2022    EGFRIFNONA 87 07/28/2021    BCR 22.2 07/14/2022     07/14/2022    K 4.4 07/14/2022    CO2 24.6 07/14/2022    CALCIUM 9.3 07/14/2022    ALBUMIN 4.40 07/14/2022    BILITOT 0.6 07/14/2022    ALKPHOS 98 07/14/2022    AST 21 07/14/2022    ALT 13 07/14/2022     HbA1c:  Lab Results   Component Value Date    HGBA1C 5.50 07/14/2022    HGBA1C 5.13 07/28/2021     Microalbumin:  No results found for: MICROALBUR, POCMALB, POCCREAT  Lipid Panel  Lab Results   Component Value Date    CHOL 187 07/14/2022    TRIG 72 07/14/2022    HDL 59 07/14/2022     (H) 07/14/2022    AST 21 07/14/2022    ALT 13 07/14/2022       Visual Acuity:  No exam data present    Age-appropriate Screening Schedule:  Refer to the list below for future screening recommendations based on patient's age, sex and/or medical conditions. Orders for these recommended tests are listed in the plan section. The patient has been provided with a written plan.    Health Maintenance   Topic Date Due   • DXA SCAN  Never done   • ZOSTER VACCINE (2 of 3) 01/09/2017   • PAP SMEAR  Never done   • INFLUENZA VACCINE  10/01/2022   • MAMMOGRAM  02/26/2023   • LIPID PANEL  07/14/2023   • TDAP/TD VACCINES (2 - Td or Tdap) 07/11/2024     "    Subjective   History of Present Illness    Luma Moon is a 67 y.o. female who presents for a Subsequent Wellness Visit and for follow-up of GERD, allergies, and dyslipidemia.    Right shoulder discomfort  The patient states that she continues working out. She states that one of the horses knocked her down and landed on her right shoulder a couple of months ago. The patient notes that her shoulder is improving, but if it does not continue to improve, she may ask for a referral for physical therapy. She states that she will continue to work through this and hopes that the shoulder will improve.     GERD  She states that her reflux is controlled.    Allergic rhinitis  She states that she takes allergy shots every 2 weeks and is doing fine. The patient reports she needs to use over-the-counter medications consistently.     Health maintenance  The patient reports that she just completed her colonoscopy. She states that she is up to date on Gynecology care. The patient reports that she will receive the second booster the first week of 08/2022. She received the Pfizer COVID-19 injection. She states that she has reviewed her blood work labs. The patient reports taking a B12 supplement daily. She inquired about symptoms of forward neck syndrome. The patient explains having a \"weird\" intermittent feeling in her neck for several months. The patient notes that she had a tight sternocleidomastoid muscle, as well as T4 kyphosis from a fractured back. She states that it has been present for 6 months. The patient notes that it has not worsened.     Previous knee injury  She reports satisfactory results when attending physical therapy for her knee issue in the past.     Medication  She notes that she is taking an aspirin daily. The patient was previously taking an aspirin every other day, but when COVID-19 occurred, she started taking the aspirin daily.       CHRONIC CONDITIONS    The following portions of the " patient's history were reviewed and updated as appropriate: allergies, current medications, past family history, past medical history, past social history, past surgical history and problem list.    Outpatient Medications Prior to Visit   Medication Sig Dispense Refill   • aspirin 81 MG EC tablet Take 81 mg by mouth Daily.     • celecoxib (CeleBREX) 50 MG capsule Take 1 capsule by mouth 2 (Two) Times a Day. 180 capsule 3   • cimetidine (TAGAMET) 200 MG tablet Take 200 mg by mouth Daily As Needed.     • Estradiol-Norethindrone Acet 0.5-0.1 MG per tablet Take 1 tablet by mouth Every Other Day.     • loratadine (CLARITIN) 10 MG tablet Take 10 mg by mouth Every Other Day.     • Methylcellulose, Laxative, (CITRUCEL PO) Take 3 tablets by mouth Daily.     • Nutritional Supplements (ANTI-INFLAMMATORY ENZYME PO) Take 1 tablet by mouth Daily.     • Nutritional Supplements (NUTRITIONAL SUPPLEMENT PO) NAD   300 mg  QD     • pseudoephedrine (SUDAFED) 120 MG 12 hr tablet Take 60 mg by mouth Daily.     • pseudoephedrine (SUDAFED) 30 MG tablet Take 30 mg by mouth Every 4 (Four) Hours As Needed for Congestion.       No facility-administered medications prior to visit.       Patient Active Problem List   Diagnosis   • Mild asthma   • Allergic rhinitis due to animal dander   • Chronic thoracic back pain   • Gastroesophageal reflux disease without esophagitis   • Simple obesity   • Allergic rhinitis       Advance Care Planning:  ACP discussion was held with the patient during this visit. Patient has an advance directive (not in EMR), copy requested.    Identification of Risk Factors:  Risk factors include: Advance Directive Discussion.    Review of Systems   Constitutional: Negative for chills, fatigue and fever.   HENT: Negative for congestion, ear pain and sinus pressure.    Respiratory: Negative for cough, chest tightness, shortness of breath and wheezing.    Cardiovascular: Negative for chest pain and palpitations.    Gastrointestinal: Negative for abdominal pain, blood in stool and constipation.   Skin: Negative for color change.   Allergic/Immunologic: Negative for environmental allergies.   Neurological: Negative for dizziness, speech difficulty and headaches.   Psychiatric/Behavioral: Negative for confusion. The patient is not nervous/anxious.        Compared to one year ago, the patient feels her physical health is the same.  Compared to one year ago, the patient feels her mental health is the same.    Objective     Physical Exam  Vitals and nursing note reviewed.   Constitutional:       Appearance: She is well-developed.   HENT:      Head: Normocephalic and atraumatic.      Right Ear: External ear normal.      Left Ear: External ear normal.      Nose: Nose normal.      Mouth/Throat:      Pharynx: No oropharyngeal exudate.   Eyes:      Conjunctiva/sclera: Conjunctivae normal.      Pupils: Pupils are equal, round, and reactive to light.   Neck:      Thyroid: No thyromegaly.      Vascular: No JVD.   Cardiovascular:      Rate and Rhythm: Normal rate and regular rhythm.      Heart sounds: Normal heart sounds. No murmur heard.    No friction rub. No gallop.   Pulmonary:      Effort: Pulmonary effort is normal. No respiratory distress.      Breath sounds: Normal breath sounds. No wheezing or rales.   Chest:      Chest wall: No tenderness.   Abdominal:      General: Bowel sounds are normal. There is no distension.      Palpations: Abdomen is soft. There is no mass.      Tenderness: There is no abdominal tenderness. There is no guarding or rebound.      Hernia: No hernia is present.   Musculoskeletal:         General: No tenderness. Normal range of motion.      Cervical back: Normal range of motion and neck supple.   Lymphadenopathy:      Cervical: No cervical adenopathy.   Skin:     General: Skin is warm and dry.      Findings: No erythema or rash.   Neurological:      Mental Status: She is alert and oriented to person, place,  "and time.      Cranial Nerves: No cranial nerve deficit.      Sensory: No sensory deficit.      Motor: No abnormal muscle tone.      Coordination: Coordination normal.      Deep Tendon Reflexes: Reflexes normal.   Psychiatric:         Behavior: Behavior normal.         Thought Content: Thought content normal.         Judgment: Judgment normal.          Procedures     Vitals:    07/18/22 1336   BP: 112/64   BP Location: Left arm   Patient Position: Sitting   Cuff Size: Adult   Pulse: 84   Temp: 98.4 °F (36.9 °C)   TempSrc: Temporal   Weight: 84.8 kg (187 lb)   Height: 162.6 cm (64\")   PainSc: 0-No pain       BMI is >= 30 and <35. (Class 1 Obesity). The following options were offered after discussion;: exercise counseling/recommendations and nutrition counseling/recommendations      Assessment & Plan      1. Prevention  - Overall, she continues to do very well. She is physically active, working on the farm, and has been able to avoid weight gain. She is up to date on colonoscopy, mammography, and GYN care.    2. GERD  - This is controlled with cimetidine.    3. Allergic rhinitis  - She will continue immunotherapy per Dr. Ross.    4. Chronic thoracic back pain with thoracic kyphosis  - She will continue Celebrex. In terms of the neck discomfort, there is no abnormality on exam. If this symptom persists, would consider CT scan.    5. Right shoulder discomfort  - Most likely due to a rotator cuff tear. Physical therapy would be the treatment if it is not improving.    6. Abnormal glucose  - A1c is normal at 5.5. Fasting glucose today is normal. We discussed the fact that at this point no therapy is needed other than healthy diet and avoidance of weight gain. If she did develop an elevated A1c above 5.6, we will consider low dose metformin to prevent further progression.    Follow up in 1 year.        Problem List Items Addressed This Visit        Allergies and Adverse Reactions    Allergic rhinitis    Relevant " Medications    celecoxib (CeleBREX) 50 MG capsule       Gastrointestinal Abdominal     Gastroesophageal reflux disease without esophagitis    Relevant Medications    cimetidine (TAGAMET) 200 MG tablet       Musculoskeletal and Injuries    Chronic thoracic back pain      Other Visit Diagnoses     Preventative health care    -  Primary        Patient Self-Management and Personalized Health Advice  The patient has been provided with information about: diet and exercise and preventive services including:   · Annual Wellness Visit (AWV).    Outpatient Encounter Medications as of 7/18/2022   Medication Sig Dispense Refill   • aspirin 81 MG EC tablet Take 81 mg by mouth Daily.     • celecoxib (CeleBREX) 50 MG capsule Take 1 capsule by mouth 2 (Two) Times a Day. 180 capsule 3   • cimetidine (TAGAMET) 200 MG tablet Take 200 mg by mouth Daily As Needed.     • Estradiol-Norethindrone Acet 0.5-0.1 MG per tablet Take 1 tablet by mouth Every Other Day.     • loratadine (CLARITIN) 10 MG tablet Take 10 mg by mouth Every Other Day.     • Methylcellulose, Laxative, (CITRUCEL PO) Take 3 tablets by mouth Daily.     • Nutritional Supplements (ANTI-INFLAMMATORY ENZYME PO) Take 1 tablet by mouth Daily.     • Nutritional Supplements (NUTRITIONAL SUPPLEMENT PO) NAD   300 mg  QD     • pseudoephedrine (SUDAFED) 120 MG 12 hr tablet Take 60 mg by mouth Daily.     • [DISCONTINUED] pseudoephedrine (SUDAFED) 30 MG tablet Take 30 mg by mouth Every 4 (Four) Hours As Needed for Congestion.       No facility-administered encounter medications on file as of 7/18/2022.       Reviewed use of high risk medication in the elderly: yes  Reviewed for potential of harmful drug interactions in the elderly: yes    Follow Up:  Return in about 1 year (around 7/18/2023) for Medicare Wellness.     There are no Patient Instructions on file for this visit.    An After Visit Summary and PPPS with all of these plans were given to the patient.        Transcribed from  ambient dictation for Daniel Ovalle MD by Lee Ann Hernandes.  07/18/22   15:37 EDT    Patient verbalized consent to the visit recording.  I have personally performed the services described in this document as transcribed by the above individual, and it is both accurate and complete.  Lee Ann Hernandes  7/21/2022  15:37 EDT

## 2023-07-24 ENCOUNTER — OFFICE VISIT (OUTPATIENT)
Dept: INTERNAL MEDICINE | Facility: CLINIC | Age: 68
End: 2023-07-24
Payer: MEDICARE

## 2023-07-24 VITALS
HEART RATE: 60 BPM | WEIGHT: 188 LBS | SYSTOLIC BLOOD PRESSURE: 116 MMHG | HEIGHT: 66 IN | BODY MASS INDEX: 30.22 KG/M2 | TEMPERATURE: 97.5 F | DIASTOLIC BLOOD PRESSURE: 70 MMHG

## 2023-07-24 DIAGNOSIS — K21.9 GASTROESOPHAGEAL REFLUX DISEASE WITHOUT ESOPHAGITIS: ICD-10-CM

## 2023-07-24 DIAGNOSIS — J30.1 SEASONAL ALLERGIC RHINITIS DUE TO POLLEN: ICD-10-CM

## 2023-07-24 DIAGNOSIS — R07.0 THROAT DISCOMFORT: ICD-10-CM

## 2023-07-24 DIAGNOSIS — Z00.00 PREVENTATIVE HEALTH CARE: Primary | ICD-10-CM

## 2023-07-24 PROCEDURE — G0439 PPPS, SUBSEQ VISIT: HCPCS | Performed by: INTERNAL MEDICINE

## 2023-07-24 PROCEDURE — 1160F RVW MEDS BY RX/DR IN RCRD: CPT | Performed by: INTERNAL MEDICINE

## 2023-07-24 PROCEDURE — 1159F MED LIST DOCD IN RCRD: CPT | Performed by: INTERNAL MEDICINE

## 2023-07-24 PROCEDURE — 99397 PER PM REEVAL EST PAT 65+ YR: CPT | Performed by: INTERNAL MEDICINE

## 2023-07-24 RX ORDER — PSEUDOEPHEDRINE HCL 30 MG
30 TABLET ORAL EVERY 4 HOURS PRN
COMMUNITY
Start: 2023-06-14 | End: 2023-07-24

## 2024-05-01 ENCOUNTER — LAB (OUTPATIENT)
Dept: LAB | Facility: HOSPITAL | Age: 69
End: 2024-05-01
Payer: MEDICARE

## 2024-05-01 ENCOUNTER — TRANSCRIBE ORDERS (OUTPATIENT)
Dept: LAB | Facility: HOSPITAL | Age: 69
End: 2024-05-01
Payer: MEDICARE

## 2024-05-01 ENCOUNTER — TRANSCRIBE ORDERS (OUTPATIENT)
Dept: CARDIOLOGY | Facility: HOSPITAL | Age: 69
End: 2024-05-01
Payer: MEDICARE

## 2024-05-01 ENCOUNTER — HOSPITAL ENCOUNTER (OUTPATIENT)
Dept: CARDIOLOGY | Facility: HOSPITAL | Age: 69
Discharge: HOME | End: 2024-05-01
Payer: MEDICARE

## 2024-05-01 DIAGNOSIS — Z01.818 PRE-OP TESTING: Primary | ICD-10-CM

## 2024-05-01 DIAGNOSIS — Z01.812 PRE-OPERATIVE LABORATORY EXAMINATION: Primary | ICD-10-CM

## 2024-05-01 DIAGNOSIS — Z01.812 PRE-OPERATIVE LABORATORY EXAMINATION: ICD-10-CM

## 2024-05-01 LAB
ALBUMIN SERPL-MCNC: 4.2 G/DL (ref 3.5–5.2)
ALBUMIN/GLOB SERPL: 1.5 G/DL
ALP SERPL-CCNC: 105 U/L (ref 39–117)
ALT SERPL W P-5'-P-CCNC: 25 U/L (ref 1–33)
ANION GAP SERPL CALCULATED.3IONS-SCNC: 8 MMOL/L (ref 5–15)
AST SERPL-CCNC: 21 U/L (ref 1–32)
BILIRUB SERPL-MCNC: 0.3 MG/DL (ref 0–1.2)
BUN SERPL-MCNC: 15 MG/DL (ref 8–23)
BUN/CREAT SERPL: 26.3 (ref 7–25)
CALCIUM SPEC-SCNC: 9.4 MG/DL (ref 8.6–10.5)
CHLORIDE SERPL-SCNC: 107 MMOL/L (ref 98–107)
CO2 SERPL-SCNC: 24 MMOL/L (ref 22–29)
CREAT SERPL-MCNC: 0.57 MG/DL (ref 0.57–1)
DEPRECATED RDW RBC AUTO: 38.9 FL (ref 37–54)
EGFRCR SERPLBLD CKD-EPI 2021: 99.1 ML/MIN/1.73
ERYTHROCYTE [DISTWIDTH] IN BLOOD BY AUTOMATED COUNT: 12.8 % (ref 12.3–15.4)
GLOBULIN UR ELPH-MCNC: 2.8 GM/DL
GLUCOSE SERPL-MCNC: 105 MG/DL (ref 65–99)
HCT VFR BLD AUTO: 43.9 % (ref 34–46.6)
HGB BLD-MCNC: 14.6 G/DL (ref 12–15.9)
MCH RBC QN AUTO: 28 PG (ref 26.6–33)
MCHC RBC AUTO-ENTMCNC: 33.3 G/DL (ref 31.5–35.7)
MCV RBC AUTO: 84.1 FL (ref 79–97)
PLATELET # BLD AUTO: 255 10*3/MM3 (ref 140–450)
PMV BLD AUTO: 9.7 FL (ref 6–12)
POTASSIUM SERPL-SCNC: 4.4 MMOL/L (ref 3.5–5.2)
PROT SERPL-MCNC: 7 G/DL (ref 6–8.5)
QT INTERVAL: 384 MS
QTC INTERVAL: 428 MS
RBC # BLD AUTO: 5.22 10*6/MM3 (ref 3.77–5.28)
SODIUM SERPL-SCNC: 139 MMOL/L (ref 136–145)
WBC NRBC COR # BLD AUTO: 7.36 10*3/MM3 (ref 3.4–10.8)

## 2024-05-01 PROCEDURE — 85027 COMPLETE CBC AUTOMATED: CPT

## 2024-05-01 PROCEDURE — 93005 ELECTROCARDIOGRAM TRACING: CPT

## 2024-05-01 PROCEDURE — 36415 COLL VENOUS BLD VENIPUNCTURE: CPT

## 2024-05-01 PROCEDURE — 80053 COMPREHEN METABOLIC PANEL: CPT

## 2024-07-22 DIAGNOSIS — E78.2 MIXED HYPERLIPIDEMIA: Primary | ICD-10-CM

## 2024-07-22 DIAGNOSIS — R73.09 ABNORMAL GLUCOSE: ICD-10-CM

## 2024-07-22 DIAGNOSIS — E53.8 B12 DEFICIENCY: ICD-10-CM

## 2024-07-23 ENCOUNTER — LAB (OUTPATIENT)
Dept: LAB | Facility: HOSPITAL | Age: 69
End: 2024-07-23
Payer: MEDICARE

## 2024-07-23 DIAGNOSIS — E78.2 MIXED HYPERLIPIDEMIA: ICD-10-CM

## 2024-07-23 DIAGNOSIS — E53.8 B12 DEFICIENCY: ICD-10-CM

## 2024-07-23 DIAGNOSIS — R73.09 ABNORMAL GLUCOSE: ICD-10-CM

## 2024-07-23 LAB
CHOLEST SERPL-MCNC: 212 MG/DL (ref 0–200)
HBA1C MFR BLD: 5.2 % (ref 4.8–5.6)
HDLC SERPL-MCNC: 54 MG/DL (ref 40–60)
LDLC SERPL CALC-MCNC: 138 MG/DL (ref 0–100)
LDLC/HDLC SERPL: 2.52 {RATIO}
TRIGL SERPL-MCNC: 110 MG/DL (ref 0–150)
VIT B12 BLD-MCNC: 421 PG/ML (ref 211–946)
VLDLC SERPL-MCNC: 20 MG/DL (ref 5–40)

## 2024-07-23 PROCEDURE — 83036 HEMOGLOBIN GLYCOSYLATED A1C: CPT

## 2024-07-23 PROCEDURE — 80061 LIPID PANEL: CPT

## 2024-07-23 PROCEDURE — 82607 VITAMIN B-12: CPT

## 2024-07-23 PROCEDURE — 36415 COLL VENOUS BLD VENIPUNCTURE: CPT

## 2024-07-23 PROCEDURE — 82172 ASSAY OF APOLIPOPROTEIN: CPT

## 2024-07-26 ENCOUNTER — OFFICE VISIT (OUTPATIENT)
Dept: INTERNAL MEDICINE | Facility: CLINIC | Age: 69
End: 2024-07-26
Payer: MEDICARE

## 2024-07-26 VITALS
DIASTOLIC BLOOD PRESSURE: 58 MMHG | HEART RATE: 72 BPM | HEIGHT: 65 IN | WEIGHT: 194 LBS | BODY MASS INDEX: 32.32 KG/M2 | SYSTOLIC BLOOD PRESSURE: 104 MMHG | TEMPERATURE: 98 F

## 2024-07-26 DIAGNOSIS — J30.81 ALLERGIC RHINITIS DUE TO ANIMAL DANDER: ICD-10-CM

## 2024-07-26 DIAGNOSIS — Z00.00 PREVENTATIVE HEALTH CARE: Primary | ICD-10-CM

## 2024-07-26 DIAGNOSIS — Z82.49 FAMILY HISTORY OF PREMATURE CAD: ICD-10-CM

## 2024-07-26 DIAGNOSIS — K21.9 GASTROESOPHAGEAL REFLUX DISEASE WITHOUT ESOPHAGITIS: ICD-10-CM

## 2024-07-26 DIAGNOSIS — G89.29 CHRONIC MIDLINE THORACIC BACK PAIN: ICD-10-CM

## 2024-07-26 DIAGNOSIS — J45.20 MILD INTERMITTENT ASTHMA WITHOUT COMPLICATION: ICD-10-CM

## 2024-07-26 DIAGNOSIS — M54.6 CHRONIC MIDLINE THORACIC BACK PAIN: ICD-10-CM

## 2024-07-26 DIAGNOSIS — E78.2 MIXED HYPERLIPIDEMIA: ICD-10-CM

## 2024-07-26 LAB — APO B SERPL-MCNC: 112 MG/DL

## 2024-07-26 PROCEDURE — 1159F MED LIST DOCD IN RCRD: CPT | Performed by: INTERNAL MEDICINE

## 2024-07-26 PROCEDURE — 1126F AMNT PAIN NOTED NONE PRSNT: CPT | Performed by: INTERNAL MEDICINE

## 2024-07-26 PROCEDURE — G0439 PPPS, SUBSEQ VISIT: HCPCS | Performed by: INTERNAL MEDICINE

## 2024-07-26 PROCEDURE — 99397 PER PM REEVAL EST PAT 65+ YR: CPT | Performed by: INTERNAL MEDICINE

## 2024-07-26 PROCEDURE — 1160F RVW MEDS BY RX/DR IN RCRD: CPT | Performed by: INTERNAL MEDICINE

## 2024-07-26 NOTE — PROGRESS NOTES
Subjective   The ABCs of the Annual Wellness Visit  Medicare Wellness Visit      Luma Moon is a 69 y.o. patient who presents for a Medicare Wellness Visit.    The following portions of the patient's history were reviewed and   updated as appropriate: allergies, current medications, past family history, past medical history, past social history, past surgical history, and problem list.    Compared to one year ago, the patient's physical   health is worse.  Compared to one year ago, the patient's mental   health is the same.    Recent Hospitalizations:  She was not admitted to the hospital during the last year.     Current Medical Providers:  Patient Care Team:  Daniel Ovalle MD as PCP - General    Outpatient Medications Prior to Visit   Medication Sig Dispense Refill    aspirin 81 MG EC tablet Take 1 tablet by mouth Daily.      celecoxib (CeleBREX) 50 MG capsule Take 1 capsule by mouth 2 (Two) Times a Day. 180 capsule 3    cimetidine (TAGAMET) 200 MG tablet Take 1 tablet by mouth Daily As Needed.      Estradiol-Norethindrone Acet 0.5-0.1 MG per tablet Take 1 tablet by mouth Every Other Day.      loratadine (CLARITIN) 10 MG tablet Take 1 tablet by mouth Daily As Needed.      Methylcellulose, Laxative, (CITRUCEL PO) Take 3 tablets by mouth Daily.      Nutritional Supplements (ANTI-INFLAMMATORY ENZYME PO) Take 1 tablet by mouth Daily.      Nutritional Supplements (NUTRITIONAL SUPPLEMENT PO) NAD   300 mg  QD       No facility-administered medications prior to visit.     No opioid medication identified on active medication list. I have reviewed chart for other potential  high risk medication/s and harmful drug interactions in the elderly.      Aspirin is on active medication list. Aspirin use is indicated based on review of current medical condition/s. Pros and cons of this therapy have been discussed today. Benefits of this medication outweigh potential harm.  Patient has been encouraged to  "continue taking this medication.  .      Patient Active Problem List   Diagnosis    Mild asthma    Allergic rhinitis due to animal dander    Chronic thoracic back pain    Gastroesophageal reflux disease without esophagitis    Simple obesity    Allergic rhinitis    Mixed hyperlipidemia     Advance Care Planning (Click this link to access ACP Navigator)  Advance Directive is not on file.  ACP discussion was held with the patient during this visit. Patient has an advance directive (not in EMR), copy requested.        Objective   Vitals:    24 1354   BP: 104/58   BP Location: Left arm   Patient Position: Sitting   Cuff Size: Adult   Pulse: 72   Temp: 98 °F (36.7 °C)   TempSrc: Temporal   Weight: 88 kg (194 lb)   Height: 165 cm (64.96\")   PainSc: 0-No pain       Estimated body mass index is 32.32 kg/m² as calculated from the following:    Height as of this encounter: 165 cm (64.96\").    Weight as of this encounter: 88 kg (194 lb).    BMI is >= 30 and <35. (Class 1 Obesity). The following options were offered after discussion;: exercise counseling/recommendations and nutrition counseling/recommendations        Does the patient have evidence of cognitive impairment? No  Lab Results   Component Value Date    TRIG 110 2024    HDL 54 2024     (H) 2024    VLDL 20 2024    HGBA1C 5.20 2024                                                                                                Health  Risk Assessment    Smoking Status:  Social History     Tobacco Use   Smoking Status Never   Smokeless Tobacco Never     Alcohol Consumption:  Social History     Substance and Sexual Activity   Alcohol Use Not Currently    Comment: used to have sangria twice a month, quit 11 years ago     Fall Risk Screen:  STEADI Fall Risk Assessment was completed, and patient is at MODERATE risk for falls. Assessment completed on:2024    Depression Screenin/26/2024     1:59 PM   PHQ-2/PHQ-9 Depression " Screening   Little Interest or Pleasure in Doing Things 0-->not at all   Feeling Down, Depressed or Hopeless 0-->not at all   PHQ-9: Brief Depression Severity Measure Score 0     Health Habits and Functional and Cognitive Screenin/26/2024     1:58 PM   Functional & Cognitive Status   Do you have difficulty preparing food and eating? No   Do you have difficulty bathing yourself, getting dressed or grooming yourself? No   Do you have difficulty using the toilet? No   Do you have difficulty moving around from place to place? No   Do you have trouble with steps or getting out of a bed or a chair? No   Current Diet Unhealthy Diet   Dental Exam Up to date   Eye Exam Up to date   Exercise (times per week) 2 times per week   Current Exercises Include Other;Walking;Yard Work        Exercise Comment yoga and stretching   Do you need help using the phone?  No   Are you deaf or do you have serious difficulty hearing?  No   Do you need help to go to places out of walking distance? No   Do you need help shopping? No   Do you need help preparing meals?  No   Do you need help with housework?  No   Do you need help with laundry? No   Do you need help taking your medications? No   Do you need help managing money? No   Do you ever drive or ride in a car without wearing a seat belt? No   Have you felt unusual stress, anger or loneliness in the last month? No   Who do you live with? Spouse   If you need help, do you have trouble finding someone available to you? No   Have you been bothered in the last four weeks by sexual problems? No   Do you have difficulty concentrating, remembering or making decisions? No             Age-appropriate Screening Schedule:  Refer to the list below for future screening recommendations based on patient's age, sex and/or medical conditions. Orders for these recommended tests are listed in the plan section. The patient has been provided with a written plan.    Health Maintenance List  Health  Maintenance   Topic Date Due    DXA SCAN  Never done    ZOSTER VACCINE (2 of 3) 01/09/2017    PAP SMEAR  Never done    MAMMOGRAM  02/26/2023    COVID-19 Vaccine (7 - 2023-24 season) 02/11/2024    TDAP/TD VACCINES (2 - Td or Tdap) 07/11/2024    ANNUAL WELLNESS VISIT  07/24/2024    BMI FOLLOWUP  07/24/2024    INFLUENZA VACCINE  08/01/2024    LIPID PANEL  07/23/2025    COLORECTAL CANCER SCREENING  06/08/2032    HEPATITIS C SCREENING  Completed    Pneumococcal Vaccine 65+  Completed                                                                                                                                                CMS Preventative Services Quick Reference  Risk Factors Identified During Encounter  None Identified    The above risks/problems have been discussed with the patient.  Pertinent information has been shared with the patient in the After Visit Summary.  An After Visit Summary and PPPS were made available to the patient.    Follow Up:   Next Medicare Wellness visit to be scheduled in 1 year.         Additional E&M Note during same encounter follows:  Patient has additional, significant, and separately identifiable condition(s)/problem(s) that require work above and beyond the Medicare Wellness Visit     Chief Complaint  Medicare Wellness-subsequent    Subjective    HPI  Luma is also being seen today for an annual adult preventative physical exam.        The patient is a 69-year-old female who comes in for subsequent Medicare annual wellness examination and for follow-up of GERD, back pain, and asthma.    The patient underwent a subtotal thyroidectomy in 05/2023, which has resulted in a significant reduction of endurance levels. She was initiated on Synthroid on 07/11/2023. Her TSH level was recorded at 4.39. Despite her condition, she maintains an active lifestyle, albeit with fewer good days. She denies experiencing chest pain but reports experiencing shortness of breath, particularly during physical  "exertion, such as ascending stairs, which she attributes to her vocal cords. She also reports experiencing fatigue. An ultrasound of her thyroid is scheduled for 09/2023.        No opioid medication identified on active medication list. I have reviewed chart for other potential  high risk medication/s and harmful drug interactions in the elderly.            Objective   Vital Signs:  /58 (BP Location: Left arm, Patient Position: Sitting, Cuff Size: Adult)   Pulse 72   Temp 98 °F (36.7 °C) (Temporal)   Ht 165 cm (64.96\")   Wt 88 kg (194 lb)   BMI 32.32 kg/m²   Physical Exam  Vitals reviewed.   Constitutional:       Appearance: Normal appearance. She is well-developed.   HENT:      Head: Normocephalic and atraumatic.      Right Ear: Tympanic membrane and ear canal normal.      Left Ear: Tympanic membrane and ear canal normal.      Mouth/Throat:      Pharynx: Oropharynx is clear. No posterior oropharyngeal erythema.   Eyes:      Extraocular Movements: Extraocular movements intact.      Pupils: Pupils are equal, round, and reactive to light.   Neck:      Thyroid: No thyromegaly.      Vascular: No carotid bruit.   Cardiovascular:      Rate and Rhythm: Normal rate and regular rhythm.      Heart sounds: Normal heart sounds. No murmur heard.     No friction rub. No gallop.   Pulmonary:      Effort: Pulmonary effort is normal.      Breath sounds: Normal breath sounds.   Abdominal:      General: Bowel sounds are normal.      Palpations: Abdomen is soft.      Tenderness: There is no abdominal tenderness.   Musculoskeletal:      Cervical back: Normal range of motion and neck supple.      Right lower leg: No edema.      Left lower leg: No edema.   Lymphadenopathy:      Cervical: No cervical adenopathy.   Skin:     General: Skin is warm and dry.   Neurological:      Mental Status: She is alert and oriented to person, place, and time.      Cranial Nerves: No cranial nerve deficit.      Motor: No weakness.      Gait: Gait " normal.   Psychiatric:         Mood and Affect: Mood normal.         Behavior: Behavior normal.                        Assessment and Plan Additional age appropriate preventative wellness advice topics were discussed during today's preventative wellness exam(some topics already addressed during AWV portion of the note above):    Physical Activity: Advised cardiovascular activity 150 minutes per week as tolerated. (example brisk walk for 30 minutes, 5 days a week).     Nutrition: Discussed nutrition plan with patient. Information shared in after visit summary. Goal is for a well balanced diet to enhance overall health.          1. Prevention.  The patient's overall condition is suboptimal. She has experienced significant complications following a partial thyroidectomy in 05/2023, including vocal cord paralysis, hoarseness, aspiration, and shortness of breath. This condition has been further discussed. She is due for mammography and GYN care, and is current with colorectal cancer screening.    2. Vocal cord paresis.  The profound fatigue she has been experiencing since the procedure could potentially resolve as the complications resolve. Her B12, thyroid levels, and CBC levels are all within normal ranges, indicating no metabolic cause for her fatigue. She does not report feeling depressed.    3. Hyperlipidemia.  Her ApoB levels are elevated. A referral for a cardiac calcium score has been made, with a moderate to a heavy calcium score. Statin and therapy will be added to her aspirin regimen. Should the fatigue not improve and her calcium levels are significantly elevated by 09/2023, a referral for a formal cardiac work-up will be considered.    4. GERD.  Her GERD is well-managed with cimetidine.    Follow-up  A follow-up appointment is scheduled for 1 year from now.    Orders Placed This Encounter   Procedures    CT Cardiac Calcium Score Without Dye     Standing Status:   Future     Standing Expiration Date:    7/26/2025     Order Specific Question:   Reason for Exam:     Answer:   fh cad, hyperlipidemia     Order Specific Question:   Release to patient     Answer:   Routine Release [[id number]]             Follow Up   Return in about 1 year (around 7/26/2025) for Medicare Wellness.  Patient was given instructions and counseling regarding her condition or for health maintenance advice. Please see specific information pulled into the AVS if appropriate.  Patient or patient representative verbalized consent for the use of Ambient Listening during the visit with  Daniel Ovalle MD for chart documentation. 7/28/2024  13:52 EDT

## 2024-09-23 RX ORDER — CELECOXIB 50 MG/1
50 CAPSULE ORAL 2 TIMES DAILY
Qty: 180 CAPSULE | Refills: 3 | Status: SHIPPED | OUTPATIENT
Start: 2024-09-23

## 2024-11-14 DIAGNOSIS — R91.1 PULMONARY NODULE: Primary | ICD-10-CM

## 2024-11-15 PROCEDURE — 87070 CULTURE OTHR SPECIMN AEROBIC: CPT | Performed by: NURSE PRACTITIONER

## 2024-11-15 PROCEDURE — 87205 SMEAR GRAM STAIN: CPT | Performed by: NURSE PRACTITIONER

## 2024-11-20 ENCOUNTER — LAB (OUTPATIENT)
Dept: LAB | Facility: HOSPITAL | Age: 69
End: 2024-11-20
Payer: MEDICARE

## 2024-11-20 ENCOUNTER — OFFICE VISIT (OUTPATIENT)
Dept: INTERNAL MEDICINE | Facility: CLINIC | Age: 69
End: 2024-11-20
Payer: MEDICARE

## 2024-11-20 VITALS
SYSTOLIC BLOOD PRESSURE: 116 MMHG | TEMPERATURE: 97.5 F | BODY MASS INDEX: 34.16 KG/M2 | DIASTOLIC BLOOD PRESSURE: 70 MMHG | HEIGHT: 63 IN | WEIGHT: 192.8 LBS

## 2024-11-20 DIAGNOSIS — L08.9 SOFT TISSUE INFECTION: Primary | ICD-10-CM

## 2024-11-20 DIAGNOSIS — L08.9 SOFT TISSUE INFECTION: ICD-10-CM

## 2024-11-20 LAB
ALBUMIN SERPL-MCNC: 4.2 G/DL (ref 3.5–5.2)
ALBUMIN/GLOB SERPL: 1.3 G/DL
ALP SERPL-CCNC: 119 U/L (ref 39–117)
ALT SERPL W P-5'-P-CCNC: 17 U/L (ref 1–33)
ANION GAP SERPL CALCULATED.3IONS-SCNC: 11.5 MMOL/L (ref 5–15)
AST SERPL-CCNC: 23 U/L (ref 1–32)
BASOPHILS # BLD AUTO: 0.03 10*3/MM3 (ref 0–0.2)
BASOPHILS NFR BLD AUTO: 0.3 % (ref 0–1.5)
BILIRUB SERPL-MCNC: 0.4 MG/DL (ref 0–1.2)
BUN SERPL-MCNC: 9 MG/DL (ref 8–23)
BUN/CREAT SERPL: 10.7 (ref 7–25)
CALCIUM SPEC-SCNC: 9.6 MG/DL (ref 8.6–10.5)
CHLORIDE SERPL-SCNC: 101 MMOL/L (ref 98–107)
CO2 SERPL-SCNC: 23.5 MMOL/L (ref 22–29)
CREAT SERPL-MCNC: 0.84 MG/DL (ref 0.57–1)
DEPRECATED RDW RBC AUTO: 37.7 FL (ref 37–54)
EGFRCR SERPLBLD CKD-EPI 2021: 75.3 ML/MIN/1.73
EOSINOPHIL # BLD AUTO: 0.17 10*3/MM3 (ref 0–0.4)
EOSINOPHIL NFR BLD AUTO: 1.9 % (ref 0.3–6.2)
ERYTHROCYTE [DISTWIDTH] IN BLOOD BY AUTOMATED COUNT: 12.6 % (ref 12.3–15.4)
GLOBULIN UR ELPH-MCNC: 3.3 GM/DL
GLUCOSE SERPL-MCNC: 88 MG/DL (ref 65–99)
HCT VFR BLD AUTO: 45.3 % (ref 34–46.6)
HGB BLD-MCNC: 15.3 G/DL (ref 12–15.9)
IMM GRANULOCYTES # BLD AUTO: 0.04 10*3/MM3 (ref 0–0.05)
IMM GRANULOCYTES NFR BLD AUTO: 0.5 % (ref 0–0.5)
LYMPHOCYTES # BLD AUTO: 1.62 10*3/MM3 (ref 0.7–3.1)
LYMPHOCYTES NFR BLD AUTO: 18.3 % (ref 19.6–45.3)
MCH RBC QN AUTO: 28.2 PG (ref 26.6–33)
MCHC RBC AUTO-ENTMCNC: 33.8 G/DL (ref 31.5–35.7)
MCV RBC AUTO: 83.4 FL (ref 79–97)
MONOCYTES # BLD AUTO: 0.81 10*3/MM3 (ref 0.1–0.9)
MONOCYTES NFR BLD AUTO: 9.1 % (ref 5–12)
NEUTROPHILS NFR BLD AUTO: 6.2 10*3/MM3 (ref 1.7–7)
NEUTROPHILS NFR BLD AUTO: 69.9 % (ref 42.7–76)
NRBC BLD AUTO-RTO: 0 /100 WBC (ref 0–0.2)
PLATELET # BLD AUTO: 346 10*3/MM3 (ref 140–450)
PMV BLD AUTO: 9.6 FL (ref 6–12)
POTASSIUM SERPL-SCNC: 4.4 MMOL/L (ref 3.5–5.2)
PROT SERPL-MCNC: 7.5 G/DL (ref 6–8.5)
RBC # BLD AUTO: 5.43 10*6/MM3 (ref 3.77–5.28)
SODIUM SERPL-SCNC: 136 MMOL/L (ref 136–145)
WBC NRBC COR # BLD AUTO: 8.87 10*3/MM3 (ref 3.4–10.8)

## 2024-11-20 PROCEDURE — 85025 COMPLETE CBC W/AUTO DIFF WBC: CPT

## 2024-11-20 PROCEDURE — 80053 COMPREHEN METABOLIC PANEL: CPT

## 2024-11-20 PROCEDURE — 36415 COLL VENOUS BLD VENIPUNCTURE: CPT

## 2024-11-20 RX ORDER — CEFTRIAXONE 1 G/1
1 INJECTION, POWDER, FOR SOLUTION INTRAMUSCULAR; INTRAVENOUS ONCE
Qty: 1 G | Refills: 0 | Status: SHIPPED | OUTPATIENT
Start: 2024-11-20 | End: 2024-11-20

## 2024-11-20 RX ORDER — CEFTRIAXONE 1 G/1
2 INJECTION, POWDER, FOR SOLUTION INTRAMUSCULAR; INTRAVENOUS ONCE
Status: COMPLETED | OUTPATIENT
Start: 2024-11-20 | End: 2024-11-20

## 2024-11-20 RX ORDER — CEFTRIAXONE 1 G/1
1 INJECTION, POWDER, FOR SOLUTION INTRAMUSCULAR; INTRAVENOUS ONCE
Status: DISCONTINUED | OUTPATIENT
Start: 2024-11-20 | End: 2024-11-20

## 2024-11-20 RX ADMIN — CEFTRIAXONE 2 G: 1 INJECTION, POWDER, FOR SOLUTION INTRAMUSCULAR; INTRAVENOUS at 12:50

## 2024-11-20 NOTE — PROGRESS NOTES
Office Note     Name: Luma Moon    : 1955     MRN: [id number]     Chief Complaint  Insect Bite    Subjective     History of Present Illness:  Luma Moon is a 69 y.o. female who presents today for reevaluation of an enlarging soft tissue wound.    History of Present Illness  The patient is a 69-year-old woman presenting for reevaluation of an insect bite. She is accompanied by an adult male.    She reports feeling unwell due to an insect bite, which she first noticed last Thursday night.  Overnight she began experiencing symptoms of malaise.  She sought urgent care on Friday and has been taking Bactrim for what was suspected to be an insect bite since then.  She reports initially over the weekend she was feeling a bit improved however there has been spreading of erythema and central scabbing of the original wound despite her antibiotic treatment.  She is again starting to have feelings of fatigue and just feeling generally unwell.  She denies any known fevers, headaches, lightheadedness.  Urgent care also obtained a wound culture which was negative for any bacteria.    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     Review of Systems:   Review of Systems   Constitutional:  Positive for fatigue. Negative for chills and fever.   Respiratory:  Negative for cough and shortness of breath.    Skin:  Positive for wound.   Neurological:  Negative for light-headedness and headache.       Past Medical History:   Past Medical History:   Diagnosis Date    Allergic 27 years ago    Allergies     Chest pain     cardiac catheterization     Colonic polyp     Compression fx, thoracic spine 2009    T9    Depression     GERD (gastroesophageal reflux disease)     Hypothyroidism 2024       Past Surgical History:   Past Surgical History:   Procedure Laterality Date    CARDIAC CATHETERIZATION       Chest pain    COLONOSCOPY  2022 last time    DILATATION AND CURETTAGE         Family History:   Family History   Problem Relation Age of Onset    Colon cancer Mother     Hypertension Mother     Kidney cancer Mother     Hyperlipidemia Mother     Prostate cancer Father     Alcohol abuse Father     Neuropathy Father     Heart disease Father         aortic stenosis    Kidney disease Father     Stroke Father         sp TAVR 1/2024    Hyperlipidemia Sister     Thyroid disease Sister     Osteoporosis Maternal Grandmother     Stroke Maternal Grandmother     Stroke Maternal Grandfather     Heart disease Paternal Grandmother         aortic stenosis    Heart disease Paternal Grandfather     Other Paternal Uncle         myelodysplatic syndrome    Other Paternal Uncle         myelodysplatic syndrome    Heart disease Paternal Uncle         aortic stenosis    Other Paternal Aunt         acute myelogenous leukemia    Heart disease Paternal Aunt         aortic stenosis    Kidney disease Brother        Social History:   Social History     Socioeconomic History    Marital status:    Tobacco Use    Smoking status: Never    Smokeless tobacco: Never   Vaping Use    Vaping status: Never Used   Substance and Sexual Activity    Alcohol use: Not Currently     Comment: used to have sangria twice a month, quit 11 years ago    Drug use: Never    Sexual activity: Yes     Partners: Male     Birth control/protection: Post-menopausal       Immunizations:   Immunization History   Administered Date(s) Administered    COVID-19 (PFIZER) 12YRS+ (COMIRNATY) 10/11/2023, 09/06/2024    COVID-19 (PFIZER) BIVALENT 12+YRS 11/16/2022    COVID-19 (PFIZER) Purple Cap Monovalent 03/03/2021, 03/26/2021, 10/04/2021    Covid-19 (Pfizer) Gray Cap Monovalent 08/05/2022    FLUAD TRI 65YR+ 11/06/2024    Fluad Quad 65+ 10/08/2021, 10/30/2023    Fluzone  >6mos 10/09/2015, 10/23/2018    Fluzone High-Dose 65+yrs 10/19/2022    Fluzone Quad >6mos (Multi-dose)  10/23/2018, 10/14/2019    Hepatitis A 10/01/2018    Influenza, Unspecified 11/29/2010    Pneumococcal Conjugate 13-Valent (PCV13) 07/14/2020    Pneumococcal Polysaccharide (PPSV23) 07/14/2021    Tdap 07/11/2014, 11/15/2024    Typhoid Live 09/07/2018    Zostavax 11/14/2016        Medications:     Current Outpatient Medications:     aspirin 81 MG EC tablet, Take 1 tablet by mouth Daily. (Patient taking differently: Take 1 tablet by mouth Daily As Needed for Mild Pain or Fever. Every other day), Disp: , Rfl:     celecoxib (CeleBREX) 50 MG capsule, Take 1 capsule by mouth 2 (Two) Times a Day., Disp: 180 capsule, Rfl: 3    cimetidine (TAGAMET) 200 MG tablet, Take 1 tablet by mouth Daily As Needed., Disp: , Rfl:     Estradiol-Norethindrone Acet 0.5-0.1 MG per tablet, Take 1 tablet by mouth Every Other Day., Disp: , Rfl:     levothyroxine (Synthroid) 50 MCG tablet, Take 1 tablet by mouth Daily., Disp: , Rfl:     loratadine (CLARITIN) 10 MG tablet, Take 1 tablet by mouth Daily As Needed., Disp: , Rfl:     Methylcellulose, Laxative, (CITRUCEL PO), Take 3 tablets by mouth Daily., Disp: , Rfl:     Nutritional Supplements (ANTI-INFLAMMATORY ENZYME PO), Take 1 tablet by mouth Daily., Disp: , Rfl:     Nutritional Supplements (NUTRITIONAL SUPPLEMENT PO), NAD   300 mg  QD, Disp: , Rfl:     sulfamethoxazole-trimethoprim (BACTRIM DS,SEPTRA DS) 800-160 MG per tablet, Take 1 tablet by mouth Every 12 (Twelve) Hours for 10 days., Disp: 20 tablet, Rfl: 0    ondansetron ODT (ZOFRAN-ODT) 4 MG disintegrating tablet, Take 1 tablet by mouth Every 8 (Eight) Hours As Needed for Nausea or Vomiting for up to 5 days. (Patient not taking: Reported on 11/20/2024), Disp: 15 tablet, Rfl: 0    Current Facility-Administered Medications:     cefTRIAXone (ROCEPHIN) injection 1 g, 1 g, Intramuscular, Once, Farzaneh Eaton MD    Allergies:   Allergies   Allergen Reactions    Latex Other (See Comments) and Unknown (See Comments)     unknown       Objective  "    Vital Signs  /70 (BP Location: Left arm, Patient Position: Sitting, Cuff Size: Adult)   Temp 97.5 °F (36.4 °C) (Temporal)   Ht 160 cm (63\")   Wt 87.5 kg (192 lb 12.8 oz)   BMI 34.15 kg/m²   Body mass index is 34.15 kg/m².            Physical Exam  Constitutional:       Appearance: Normal appearance.   HENT:      Head: Normocephalic and atraumatic.   Eyes:      Extraocular Movements: Extraocular movements intact.      Conjunctiva/sclera: Conjunctivae normal.   Pulmonary:      Effort: Pulmonary effort is normal. No respiratory distress.   Skin:     Comments: Wound as show in picture below located on right upper back. No underlying fluctuance. Tender to area of erythema   Neurological:      General: No focal deficit present.      Mental Status: She is alert and oriented to person, place, and time.   Psychiatric:         Mood and Affect: Mood normal.         Behavior: Behavior normal.            Assessment and Plan     Assessment/Plan:  Diagnoses and all orders for this visit:    1. Soft tissue infection (Primary)  -At this point it is unclear what is the origin of this wound however may be due to a an insect bite.  She has had worsening of surrounding erythema and tenderness despite being started on Bactrim last Friday.  Wound culture results were negative for bacterial growth.  She was given a tetanus booster at an urgent care.  -Will give IM ceftriaxone today, will joseph the edges of the wound, will follow-up closely tomorrow to evaluate if there is any improvement.  Will also check baseline labs today.  -     CBC & Differential; Future  -     Comprehensive metabolic panel; Future  -     cefTRIAXone (ROCEPHIN) injection 1 g    Assessment & Plan  1.  Soft tissue infection  The patient presents with a possible insect bite.  That was first noticed last Thursday night. She visited urgent treatment on Friday and has been on Bactrim since then. Despite the treatment, the cellulitis has spread, becoming more " tender, and the patient feels worse.  Today we will give 2 g ceftriaxone IM and plan to follow-up closely tomorrow to reevaluate the wound.  I have marked the edges of the wound with a sharpie marker.  If there is improvement in erythema we may continue to treat with IM ceftriaxone outpatient however if there is continued spread or worsening necrosis of the wound I will likely advise presentation to the hospital for evaluation.  Will also obtain baseline labs including CBC and CMP today.          Follow Up  Return in about 1 day (around 11/21/2024) for Recheck.    Patient or patient representative verbalized consent for the use of Ambient Listening during the visit with  Farzaneh Eaton MD for chart documentation. 11/20/2024  12:28 XIOMY Eaton MD   [facility] [place] [facility] [place] [id number]

## 2024-11-21 ENCOUNTER — OFFICE VISIT (OUTPATIENT)
Dept: INTERNAL MEDICINE | Facility: CLINIC | Age: 69
End: 2024-11-21
Payer: MEDICARE

## 2024-11-21 VITALS
BODY MASS INDEX: 34.02 KG/M2 | DIASTOLIC BLOOD PRESSURE: 72 MMHG | SYSTOLIC BLOOD PRESSURE: 124 MMHG | TEMPERATURE: 97.7 F | WEIGHT: 192 LBS | OXYGEN SATURATION: 97 % | HEIGHT: 63 IN | HEART RATE: 90 BPM

## 2024-11-21 DIAGNOSIS — L08.9 SOFT TISSUE INFECTION: Primary | ICD-10-CM

## 2024-11-21 RX ORDER — CEFTRIAXONE 1 G/1
1 INJECTION, POWDER, FOR SOLUTION INTRAMUSCULAR; INTRAVENOUS ONCE
Status: COMPLETED | OUTPATIENT
Start: 2024-11-21 | End: 2024-11-21

## 2024-11-21 RX ORDER — CEFDINIR 300 MG/1
300 CAPSULE ORAL 2 TIMES DAILY
Qty: 14 CAPSULE | Refills: 0 | Status: SHIPPED | OUTPATIENT
Start: 2024-11-23 | End: 2024-11-30

## 2024-11-21 RX ADMIN — CEFTRIAXONE 1 G: 1 INJECTION, POWDER, FOR SOLUTION INTRAMUSCULAR; INTRAVENOUS at 13:20

## 2024-11-21 NOTE — PROGRESS NOTES
Office Note     Name: Luma Moon    : 1955     MRN: [id number]     Chief Complaint  Soft tissue infection (1 day follow up )    Subjective     History of Present Illness:  Luma Moon is a 69 y.o. female who presents today for reevaluation of soft tissue infection.  She was seen yesterday for spreading erythema and central scarring at site of previous possible insect bite.  She had previously been on Bactrim for about 5 days prior to initial presentation to this clinic.  Given that she had had no response with the Bactrim we gave 2 g of IM ceftriaxone on .  Today the erythema has started to improve, she also reports symptomatic improvement in her feelings of fatigue.  There is less tenderness to the area.  History of Present Illness      Review of Systems:   Review of Systems   Constitutional:  Negative for chills and fever.   Respiratory:  Negative for cough and shortness of breath.    Neurological:  Negative for headache.   All other systems reviewed and are negative.      Past Medical History:   Past Medical History:   Diagnosis Date    Allergic 27 years ago    Allergies     Chest pain     cardiac catheterization 2006    Colonic polyp     Compression fx, thoracic spine 2009    T9    Depression     GERD (gastroesophageal reflux disease)     Hypothyroidism 2024       Past Surgical History:   Past Surgical History:   Procedure Laterality Date    CARDIAC CATHETERIZATION      Chest pain    COLONOSCOPY   last time    DILATATION AND CURETTAGE         Family History:   Family History   Problem Relation Age of Onset    Colon cancer Mother     Hypertension Mother     Kidney cancer Mother     Hyperlipidemia Mother     Prostate cancer Father     Alcohol abuse Father     Neuropathy Father     Heart disease Father         aortic stenosis    Kidney disease Father     Stroke Father         sp TAVR 2024    Hyperlipidemia Sister     Thyroid disease Sister     Osteoporosis  Maternal Grandmother     Stroke Maternal Grandmother     Stroke Maternal Grandfather     Heart disease Paternal Grandmother         aortic stenosis    Heart disease Paternal Grandfather     Other Paternal Uncle         myelodysplatic syndrome    Other Paternal Uncle         myelodysplatic syndrome    Heart disease Paternal Uncle         aortic stenosis    Other Paternal Aunt         acute myelogenous leukemia    Heart disease Paternal Aunt         aortic stenosis    Kidney disease Brother        Social History:   Social History     Socioeconomic History    Marital status:    Tobacco Use    Smoking status: Never    Smokeless tobacco: Never   Vaping Use    Vaping status: Never Used   Substance and Sexual Activity    Alcohol use: Not Currently     Comment: used to have sangria twice a month, quit 11 years ago    Drug use: Never    Sexual activity: Yes     Partners: Male     Birth control/protection: Post-menopausal       Immunizations:   Immunization History   Administered Date(s) Administered    COVID-19 (PFIZER) 12YRS+ (COMIRNATY) 10/11/2023, 09/06/2024    COVID-19 (PFIZER) BIVALENT 12+YRS 11/16/2022    COVID-19 (PFIZER) Purple Cap Monovalent 03/03/2021, 03/26/2021, 10/04/2021    Covid-19 (Pfizer) Gray Cap Monovalent 08/05/2022    FLUAD TRI 65YR+ 11/06/2024    Fluad Quad 65+ 10/08/2021, 10/30/2023    Fluzone  >6mos 10/09/2015, 10/23/2018    Fluzone High-Dose 65+yrs 10/19/2022    Fluzone Quad >6mos (Multi-dose) 10/23/2018, 10/14/2019    Hepatitis A 10/01/2018    Influenza, Unspecified 11/29/2010    Pneumococcal Conjugate 13-Valent (PCV13) 07/14/2020    Pneumococcal Polysaccharide (PPSV23) 07/14/2021    Tdap 07/11/2014, 11/15/2024    Typhoid Live 09/07/2018    Zostavax 11/14/2016        Medications:     Current Outpatient Medications:     aspirin 81 MG EC tablet, Take 1 tablet by mouth Daily. (Patient taking differently: Take 1 tablet by mouth Daily As Needed for Mild Pain or Fever. Every other day), Disp: ,  "Rfl:     celecoxib (CeleBREX) 50 MG capsule, Take 1 capsule by mouth 2 (Two) Times a Day., Disp: 180 capsule, Rfl: 3    cimetidine (TAGAMET) 200 MG tablet, Take 1 tablet by mouth Daily As Needed., Disp: , Rfl:     Estradiol-Norethindrone Acet 0.5-0.1 MG per tablet, Take 1 tablet by mouth Every Other Day., Disp: , Rfl:     levothyroxine (Synthroid) 50 MCG tablet, Take 1 tablet by mouth Daily., Disp: , Rfl:     loratadine (CLARITIN) 10 MG tablet, Take 1 tablet by mouth Daily As Needed., Disp: , Rfl:     Methylcellulose, Laxative, (CITRUCEL PO), Take 3 tablets by mouth Daily., Disp: , Rfl:     Nutritional Supplements (ANTI-INFLAMMATORY ENZYME PO), Take 1 tablet by mouth Daily., Disp: , Rfl:     Nutritional Supplements (NUTRITIONAL SUPPLEMENT PO), NAD   300 mg  QD, Disp: , Rfl:     sulfamethoxazole-trimethoprim (BACTRIM DS,SEPTRA DS) 800-160 MG per tablet, Take 1 tablet by mouth Every 12 (Twelve) Hours for 10 days., Disp: 20 tablet, Rfl: 0    [START ON 11/23/2024] cefdinir (OMNICEF) 300 MG capsule, Take 1 capsule by mouth 2 (Two) Times a Day for 7 days., Disp: 14 capsule, Rfl: 0    Current Facility-Administered Medications:     cefTRIAXone (ROCEPHIN) injection 1 g, 1 g, Intramuscular, Once, Farzaneh Eaton MD    Allergies:   Allergies   Allergen Reactions    Latex Other (See Comments) and Unknown (See Comments)     unknown       Objective     Vital Signs  /72 (BP Location: Left arm, Patient Position: Sitting, Cuff Size: Adult)   Pulse 90   Temp 97.7 °F (36.5 °C) (Temporal)   Ht 160 cm (63\")   Wt 87.1 kg (192 lb)   SpO2 97%   BMI 34.01 kg/m²   Body mass index is 34.01 kg/m².            Physical Exam  Constitutional:       Appearance: Normal appearance.   HENT:      Head: Normocephalic and atraumatic.   Eyes:      Extraocular Movements: Extraocular movements intact.      Conjunctiva/sclera: Conjunctivae normal.   Skin:     Comments: Area of erythema and central scarring as seen in the picture below "   Neurological:      General: No focal deficit present.      Mental Status: She is alert and oriented to person, place, and time.   Psychiatric:         Mood and Affect: Mood normal.         Behavior: Behavior normal.      11/21 11/20      Assessment and Plan     Assessment/Plan:  Diagnoses and all orders for this visit:    1. Soft tissue infection (Primary)  - She has had a bit of improvement in the surrounding erythema as pictured above as well as symptomatic improvement.  - Will give an additional IM injection of 1 g ceftriaxone today and plan for nursing visit tomorrow for repeat 1 g injection of ceftriaxone.  Following that I will plan to transition to oral cefdinir 300 mg twice daily for 7 days to start on Saturday 11/23 following her third dose of IM ceftriaxone.  She may continue to complete the course of oral Bactrim prescribed by urgent care in conjunction with the addition of cefdinir.  - If patient experiences worsening of her infection after switching to oral cefdinir I have advised her to represent for evaluation on Monday.  -     cefTRIAXone (ROCEPHIN) injection 1 g  -     cefdinir (OMNICEF) 300 MG capsule; Take 1 capsule by mouth 2 (Two) Times a Day for 7 days.  Dispense: 14 capsule; Refill: 0    Assessment & Plan        Follow Up  No follow-ups on file.        Farzaneh Eaton MD   [facility] [place] [place] [facility] [address]

## 2024-11-22 ENCOUNTER — CLINICAL SUPPORT (OUTPATIENT)
Dept: INTERNAL MEDICINE | Facility: CLINIC | Age: 69
End: 2024-11-22
Payer: MEDICARE

## 2024-11-22 DIAGNOSIS — L08.9 SOFT TISSUE INFECTION: Primary | ICD-10-CM

## 2024-11-22 DIAGNOSIS — T63.301D SPIDER BITE WOUND, ACCIDENTAL OR UNINTENTIONAL, SUBSEQUENT ENCOUNTER: Primary | ICD-10-CM

## 2024-11-22 PROCEDURE — 96372 THER/PROPH/DIAG INJ SC/IM: CPT | Performed by: INTERNAL MEDICINE

## 2024-11-22 RX ORDER — CEFTRIAXONE 1 G/1
1 INJECTION, POWDER, FOR SOLUTION INTRAMUSCULAR; INTRAVENOUS ONCE
Status: COMPLETED | OUTPATIENT
Start: 2024-11-22 | End: 2024-11-22

## 2024-11-22 RX ADMIN — CEFTRIAXONE 1 G: 1 INJECTION, POWDER, FOR SOLUTION INTRAMUSCULAR; INTRAVENOUS at 12:06

## 2024-12-10 ENCOUNTER — HOSPITAL ENCOUNTER (OUTPATIENT)
Dept: CT IMAGING | Facility: HOSPITAL | Age: 69
Discharge: HOME | End: 2024-12-10
Admitting: INTERNAL MEDICINE
Payer: MEDICARE

## 2024-12-10 DIAGNOSIS — R91.1 PULMONARY NODULE: ICD-10-CM

## 2024-12-10 PROCEDURE — 71250 CT THORAX DX C-: CPT

## 2024-12-15 DIAGNOSIS — R91.1 PULMONARY NODULE: Primary | ICD-10-CM

## 2025-06-13 ENCOUNTER — HOSPITAL ENCOUNTER (OUTPATIENT)
Dept: CT IMAGING | Facility: HOSPITAL | Age: 70
Discharge: HOME | End: 2025-06-13
Payer: MEDICARE

## 2025-06-13 DIAGNOSIS — R91.1 PULMONARY NODULE: ICD-10-CM

## 2025-06-13 PROCEDURE — 71250 CT THORAX DX C-: CPT

## 2025-06-19 ENCOUNTER — RESULTS FOLLOW-UP (OUTPATIENT)
Dept: INTERNAL MEDICINE | Facility: CLINIC | Age: 70
End: 2025-06-19
Payer: MEDICARE

## 2025-07-28 ENCOUNTER — OFFICE VISIT (OUTPATIENT)
Dept: INTERNAL MEDICINE | Facility: CLINIC | Age: 70
End: 2025-07-28
Payer: MEDICARE

## 2025-07-28 VITALS
WEIGHT: 191.2 LBS | TEMPERATURE: 97.7 F | OXYGEN SATURATION: 93 % | DIASTOLIC BLOOD PRESSURE: 82 MMHG | HEIGHT: 63 IN | BODY MASS INDEX: 33.88 KG/M2 | SYSTOLIC BLOOD PRESSURE: 126 MMHG

## 2025-07-28 DIAGNOSIS — E53.8 B12 DEFICIENCY: ICD-10-CM

## 2025-07-28 DIAGNOSIS — E78.2 MIXED HYPERLIPIDEMIA: ICD-10-CM

## 2025-07-28 DIAGNOSIS — R73.09 ABNORMAL GLUCOSE: ICD-10-CM

## 2025-07-28 DIAGNOSIS — R53.83 OTHER FATIGUE: ICD-10-CM

## 2025-07-28 DIAGNOSIS — Z00.00 MEDICARE ANNUAL WELLNESS VISIT, SUBSEQUENT: Primary | ICD-10-CM

## 2025-07-28 PROCEDURE — 1126F AMNT PAIN NOTED NONE PRSNT: CPT | Performed by: INTERNAL MEDICINE

## 2025-07-28 PROCEDURE — 1160F RVW MEDS BY RX/DR IN RCRD: CPT | Performed by: INTERNAL MEDICINE

## 2025-07-28 PROCEDURE — G0439 PPPS, SUBSEQ VISIT: HCPCS | Performed by: INTERNAL MEDICINE

## 2025-07-28 PROCEDURE — 99397 PER PM REEVAL EST PAT 65+ YR: CPT | Performed by: INTERNAL MEDICINE

## 2025-07-28 PROCEDURE — 1159F MED LIST DOCD IN RCRD: CPT | Performed by: INTERNAL MEDICINE

## 2025-07-28 RX ORDER — PSEUDOEPHEDRINE HCL 30 MG/1
30 TABLET, FILM COATED ORAL DAILY
COMMUNITY

## 2025-07-28 NOTE — PROGRESS NOTES
Subjective   The ABCs of the Annual Wellness Visit  Medicare Wellness Visit      Luma Moon is a 70 y.o. patient who presents for a Medicare Wellness Visit.    The following portions of the patient's history were reviewed and   updated as appropriate: allergies, current medications, past family history, past medical history, past social history, past surgical history, and problem list.    Compared to one year ago, the patient's physical   health is the same.  Compared to one year ago, the patient's mental   health is the same.    Recent Hospitalizations:  She was not admitted to hospital within 365 days.    Current Medical Providers:  Patient Care Team:  Daniel Ovalle MD as PCP - General    Outpatient Medications Prior to Visit   Medication Sig Dispense Refill    aspirin 81 MG EC tablet Take 1 tablet by mouth Daily. (Patient taking differently: Take 1 tablet by mouth Daily As Needed for Mild Pain or Fever. Every other day)      celecoxib (CeleBREX) 50 MG capsule Take 1 capsule by mouth 2 (Two) Times a Day. 180 capsule 3    cimetidine (TAGAMET) 200 MG tablet Take 1 tablet by mouth Daily As Needed.      Estradiol-Norethindrone Acet 0.5-0.1 MG per tablet Take 1 tablet by mouth Every Other Day.      loratadine (CLARITIN) 10 MG tablet Take 1 tablet by mouth Daily As Needed.      Methylcellulose, Laxative, (CITRUCEL PO) Take 3 tablets by mouth Daily.      Nutritional Supplements (ANTI-INFLAMMATORY ENZYME PO) Take 1 tablet by mouth Daily.      Nutritional Supplements (NUTRITIONAL SUPPLEMENT PO) NAD   300 mg  every other day      pseudoephedrine (SUDAFED) 30 MG tablet Take 1 tablet by mouth Daily. 1-2 tablets, seasonal      levothyroxine (Synthroid) 50 MCG tablet Take 1 tablet by mouth Daily.       No facility-administered medications prior to visit.     No opioid medication identified on active medication list. I have reviewed chart for other potential  high risk medication/s and harmful drug  "interactions in the elderly.      Aspirin is on active medication list. Aspirin use is indicated based on review of current medical condition/s. Pros and cons of this therapy have been discussed today. Benefits of this medication outweigh potential harm.  Patient has been encouraged to continue taking this medication.  .      Patient Active Problem List   Diagnosis    Mild asthma    Allergic rhinitis due to animal dander    Chronic thoracic back pain    Gastroesophageal reflux disease without esophagitis    Simple obesity    Allergic rhinitis    Mixed hyperlipidemia     Advance Care Planning Advance Directive is not on file.  ACP discussion was held with the patient during this visit. Patient has an advance directive (not in EMR), copy requested.            Objective   Vitals:    07/28/25 1333   BP: 126/82   BP Location: Left arm   Patient Position: Sitting   Cuff Size: Adult   Temp: 97.7 °F (36.5 °C)   TempSrc: Infrared   SpO2: 93%   Weight: 86.7 kg (191 lb 3.2 oz)   Height: 160 cm (62.99\")   PainSc: 0-No pain       Estimated body mass index is 33.88 kg/m² as calculated from the following:    Height as of this encounter: 160 cm (62.99\").    Weight as of this encounter: 86.7 kg (191 lb 3.2 oz).    BMI is >= 30 and <35. (Class 1 Obesity). The following options were offered after discussion;: exercise counseling/recommendations and nutrition counseling/recommendations           Does the patient have evidence of cognitive impairment? No                                                                                                Health  Risk Assessment    Smoking Status:  Social History     Tobacco Use   Smoking Status Never   Smokeless Tobacco Never     Alcohol Consumption:  Social History     Substance and Sexual Activity   Alcohol Use Not Currently    Comment: used to have sangria twice a month, quit 11 years ago       Fall Risk Screen  STEADI Fall Risk Assessment was completed, and patient is at LOW risk for " falls.Assessment completed on:2025    Depression Screening   Little interest or pleasure in doing things? Not at all   Feeling down, depressed, or hopeless? Not at all   PHQ-2 Total Score 0      Health Habits and Functional and Cognitive Screenin/21/2025     1:35 PM   Functional & Cognitive Status   Do you have difficulty preparing food and eating? No   Do you have difficulty bathing yourself, getting dressed or grooming yourself? No   Do you have difficulty using the toilet? No   Do you have difficulty moving around from place to place? No   Do you have trouble with steps or getting out of a bed or a chair? No   Current Diet Other   Dental Exam Up to date   Eye Exam Up to date   Exercise (times per week) Other   Current Exercises Include Gardening;Other;Yard Work   Do you need help using the phone?  No   Are you deaf or do you have serious difficulty hearing?  No   Do you need help to go to places out of walking distance? No   Do you need help shopping? No   Do you need help preparing meals?  No   Do you need help with housework?  No   Do you need help with laundry? No   Do you need help taking your medications? No   Do you need help managing money? No   Do you ever drive or ride in a car without wearing a seat belt? No   Have you felt unusual fatigue (could be tiredness), stress, anger or loneliness in the last month? No   Who do you live with? Spouse   If you need help, do you have trouble finding someone available to you? No   Have you been bothered in the last four weeks by sexual problems? No   Do you have difficulty concentrating, remembering or making decisions? No           Age-appropriate Screening Schedule:  Refer to the list below for future screening recommendations based on patient's age, sex and/or medical conditions. Orders for these recommended tests are listed in the plan section. The patient has been provided with a written plan.    Health Maintenance List  Health Maintenance   Topic  Date Due    DXA SCAN  Never done    ZOSTER VACCINE (2 of 3) 01/09/2017    MAMMOGRAM  02/26/2023    COVID-19 Vaccine (8 - 2024-25 season) 03/06/2025    LIPID PANEL  07/23/2025    ANNUAL WELLNESS VISIT  07/26/2025    INFLUENZA VACCINE  10/01/2025    COLORECTAL CANCER SCREENING  06/08/2032    TDAP/TD VACCINES (3 - Td or Tdap) 11/15/2034    HEPATITIS C SCREENING  Completed    Pneumococcal Vaccine 50+  Completed                                                                                                                                                CMS Preventative Services Quick Reference  Risk Factors Identified During Encounter  None Identified    The above risks/problems have been discussed with the patient.  Pertinent information has been shared with the patient in the After Visit Summary.  An After Visit Summary and PPPS were made available to the patient.    Follow Up:   Next Medicare Wellness visit to be scheduled in 1 year.     No opioid medication identified on active medication list. I have reviewed chart for other potential  high risk medication/s and harmful drug interactions in the elderly.      Additional E&M Note during same encounter follows:  Patient has additional, significant, and separately identifiable condition(s)/problem(s) that require work above and beyond the Medicare Wellness Visit     Chief Complaint  Medicare Wellness-subsequent and Hypothyroidism    Subjective    HPI  Luma is also being seen today for an annual adult preventative physical exam.        The patient is a 70-year-old female who presents for a subsequent Medicare annual wellness examination and follow-up of hyperlipidemia, allergic rhinitis, and asthma.    She reports no current health concerns or issues. She spends time in the barn with animals and gardening twice a day. She acknowledges the need to schedule a mammogram, which she plans to do at [facility].    She expresses hope that her cholesterol levels have  "improved this year due to better management of her hypothyroidism.    Her allergic rhinitis symptoms are currently under control.    She was prescribed Paxlovid 2 weeks ago, which has significantly improved her condition.          Objective   Vital Signs:  /82 (BP Location: Left arm, Patient Position: Sitting, Cuff Size: Adult)   Temp 97.7 °F (36.5 °C) (Infrared)   Ht 160 cm (62.99\")   Wt 86.7 kg (191 lb 3.2 oz)   SpO2 93%   BMI 33.88 kg/m²   Physical Exam  Vitals reviewed.   Constitutional:       Appearance: Normal appearance. She is well-developed.   HENT:      Head: Normocephalic and atraumatic.      Right Ear: Tympanic membrane and ear canal normal.      Left Ear: Tympanic membrane and ear canal normal.      Mouth/Throat:      Pharynx: Oropharynx is clear. No posterior oropharyngeal erythema.   Eyes:      Extraocular Movements: Extraocular movements intact.      Conjunctiva/sclera: Conjunctivae normal.      Pupils: Pupils are equal, round, and reactive to light.   Neck:      Thyroid: No thyromegaly.      Vascular: No carotid bruit.   Cardiovascular:      Rate and Rhythm: Normal rate and regular rhythm.      Heart sounds: Normal heart sounds. No murmur heard.     No friction rub. No gallop.   Pulmonary:      Effort: Pulmonary effort is normal.      Breath sounds: Normal breath sounds.   Abdominal:      General: Bowel sounds are normal.      Palpations: Abdomen is soft. There is no mass.      Tenderness: There is no abdominal tenderness.   Musculoskeletal:      Cervical back: Normal range of motion and neck supple.      Right lower leg: No edema.      Left lower leg: No edema.   Lymphadenopathy:      Cervical: No cervical adenopathy.   Skin:     General: Skin is warm and dry.   Neurological:      Mental Status: She is alert and oriented to person, place, and time.      Cranial Nerves: No cranial nerve deficit.      Motor: No weakness.      Gait: Gait normal.   Psychiatric:         Mood and Affect: Mood " normal.         Behavior: Behavior normal.           General: Appears well, no acute distress.  Neurological: Alert and oriented.  Respiratory: Clear to auscultation, no wheezing, rales or rhonchi.  Cardiovascular: Regular rate and rhythm, no murmurs, rubs, or gallops.  Gastrointestinal: Soft, no tenderness, no distention, no masses.            Results  Labs   - LDL cholesterol: Elevated   - Apolipoprotein B: High           Assessment and Plan Additional age appropriate preventative wellness advice topics were discussed during today's preventative wellness exam(some topics already addressed during AWV portion of the note above):    Physical Activity: Advised cardiovascular activity 150 minutes per week as tolerated. (example brisk walk for 30 minutes, 5 days a week).     Nutrition: Discussed nutrition plan with patient. Information shared in after visit summary. Goal is for a well balanced diet to enhance overall health.     Healthy Weight: Discussed current and goal BMI with patient. Steps to attain this goal discussed. Information shared in after visit summary.     Injury Prevention Discussion:  Information shared in after visit summary.             1. Health maintenance.  - Overall, she is doing well and maintains good lifestyle habits.  - Up to date on colorectal cancer screening but overdue for mammography.  - Plans to schedule mammography soon.  - Reviewed health maintenance.    2. Hyperlipidemia.  - Lipid panel is pending.  - Last year, LDL was elevated, and apolipoprotein B was high.  - If apolipoprotein B remains >100, a statin would be recommended.  - Currently tolerating rosuvastatin.    3. Allergic rhinitis.  - Symptoms are currently controlled.  - No new symptoms reported.  - Reviewed current status of allergic rhinitis.  - No changes in treatment required.    4. Gastroesophageal reflux disease (GERD).  - Continue cimetidine as needed.  - No new symptoms reported.  - Reviewed current status of GERD.  -  No changes in treatment required.    5. Asthma.  - Asthma is currently asymptomatic.  - Not requiring treatment at this time.  - Reviewed current status of asthma.  - No changes in treatment required.    Follow-up: The patient will follow up in 1 year.         Follow Up   Return in about 1 year (around 7/28/2026) for Medicare Wellness.  Patient was given instructions and counseling regarding her condition or for health maintenance advice. Please see specific information pulled into the AVS if appropriate.  Patient or patient representative verbalized consent for the use of Ambient Listening during the visit with  Daniel Ovalle MD for chart documentation. 7/29/2025  15:33 EDT

## 2025-07-29 ENCOUNTER — TRANSCRIBE ORDERS (OUTPATIENT)
Dept: ADMINISTRATIVE | Facility: HOSPITAL | Age: 70
End: 2025-07-29
Payer: MEDICARE

## 2025-07-29 DIAGNOSIS — Z12.31 VISIT FOR SCREENING MAMMOGRAM: Primary | ICD-10-CM

## 2025-08-11 ENCOUNTER — LAB (OUTPATIENT)
Dept: LAB | Facility: HOSPITAL | Age: 70
End: 2025-08-11
Payer: MEDICARE

## 2025-08-11 DIAGNOSIS — R53.83 OTHER FATIGUE: ICD-10-CM

## 2025-08-11 DIAGNOSIS — R73.09 ABNORMAL GLUCOSE: ICD-10-CM

## 2025-08-11 DIAGNOSIS — E78.2 MIXED HYPERLIPIDEMIA: ICD-10-CM

## 2025-08-11 DIAGNOSIS — E53.8 B12 DEFICIENCY: ICD-10-CM

## 2025-08-11 LAB
ALBUMIN SERPL-MCNC: 4.2 G/DL (ref 3.5–5.2)
ALBUMIN/GLOB SERPL: 1.3 G/DL
ALP SERPL-CCNC: 111 U/L (ref 39–117)
ALT SERPL W P-5'-P-CCNC: 18 U/L (ref 1–33)
ANION GAP SERPL CALCULATED.3IONS-SCNC: 11.3 MMOL/L (ref 5–15)
AST SERPL-CCNC: 24 U/L (ref 1–32)
BASOPHILS # BLD AUTO: 0.04 10*3/MM3 (ref 0–0.2)
BASOPHILS NFR BLD AUTO: 0.5 % (ref 0–1.5)
BILIRUB SERPL-MCNC: 0.6 MG/DL (ref 0–1.2)
BUN SERPL-MCNC: 16 MG/DL (ref 8–23)
BUN/CREAT SERPL: 20.5 (ref 7–25)
CALCIUM SPEC-SCNC: 9.1 MG/DL (ref 8.6–10.5)
CHLORIDE SERPL-SCNC: 106 MMOL/L (ref 98–107)
CHOLEST SERPL-MCNC: 200 MG/DL (ref 0–200)
CO2 SERPL-SCNC: 22.7 MMOL/L (ref 22–29)
CREAT SERPL-MCNC: 0.78 MG/DL (ref 0.57–1)
DEPRECATED RDW RBC AUTO: 40.6 FL (ref 37–54)
EGFRCR SERPLBLD CKD-EPI 2021: 81.8 ML/MIN/1.73
EOSINOPHIL # BLD AUTO: 0.19 10*3/MM3 (ref 0–0.4)
EOSINOPHIL NFR BLD AUTO: 2.4 % (ref 0.3–6.2)
ERYTHROCYTE [DISTWIDTH] IN BLOOD BY AUTOMATED COUNT: 13.2 % (ref 12.3–15.4)
GLOBULIN UR ELPH-MCNC: 3.2 GM/DL
GLUCOSE SERPL-MCNC: 87 MG/DL (ref 65–99)
HBA1C MFR BLD: 5.6 % (ref 4.8–5.6)
HCT VFR BLD AUTO: 43.9 % (ref 34–46.6)
HDLC SERPL-MCNC: 49 MG/DL (ref 40–60)
HGB BLD-MCNC: 14.9 G/DL (ref 12–15.9)
IMM GRANULOCYTES # BLD AUTO: 0.04 10*3/MM3 (ref 0–0.05)
IMM GRANULOCYTES NFR BLD AUTO: 0.5 % (ref 0–0.5)
LDLC SERPL CALC-MCNC: 125 MG/DL (ref 0–100)
LDLC/HDLC SERPL: 2.48 {RATIO}
LYMPHOCYTES # BLD AUTO: 1.5 10*3/MM3 (ref 0.7–3.1)
LYMPHOCYTES NFR BLD AUTO: 19.3 % (ref 19.6–45.3)
MCH RBC QN AUTO: 28.9 PG (ref 26.6–33)
MCHC RBC AUTO-ENTMCNC: 33.9 G/DL (ref 31.5–35.7)
MCV RBC AUTO: 85.1 FL (ref 79–97)
MONOCYTES # BLD AUTO: 0.56 10*3/MM3 (ref 0.1–0.9)
MONOCYTES NFR BLD AUTO: 7.2 % (ref 5–12)
NEUTROPHILS NFR BLD AUTO: 5.46 10*3/MM3 (ref 1.7–7)
NEUTROPHILS NFR BLD AUTO: 70.1 % (ref 42.7–76)
NRBC BLD AUTO-RTO: 0 /100 WBC (ref 0–0.2)
PLATELET # BLD AUTO: 240 10*3/MM3 (ref 140–450)
PMV BLD AUTO: 9.9 FL (ref 6–12)
POTASSIUM SERPL-SCNC: 4.4 MMOL/L (ref 3.5–5.2)
PROT SERPL-MCNC: 7.4 G/DL (ref 6–8.5)
RBC # BLD AUTO: 5.16 10*6/MM3 (ref 3.77–5.28)
SODIUM SERPL-SCNC: 140 MMOL/L (ref 136–145)
TRIGL SERPL-MCNC: 148 MG/DL (ref 0–150)
VIT B12 BLD-MCNC: 389 PG/ML (ref 211–946)
VLDLC SERPL-MCNC: 26 MG/DL (ref 5–40)
WBC NRBC COR # BLD AUTO: 7.79 10*3/MM3 (ref 3.4–10.8)

## 2025-08-11 PROCEDURE — 80061 LIPID PANEL: CPT

## 2025-08-11 PROCEDURE — 36415 COLL VENOUS BLD VENIPUNCTURE: CPT

## 2025-08-11 PROCEDURE — 83036 HEMOGLOBIN GLYCOSYLATED A1C: CPT

## 2025-08-11 PROCEDURE — 85025 COMPLETE CBC W/AUTO DIFF WBC: CPT

## 2025-08-11 PROCEDURE — 82172 ASSAY OF APOLIPOPROTEIN: CPT

## 2025-08-11 PROCEDURE — 80053 COMPREHEN METABOLIC PANEL: CPT

## 2025-08-11 PROCEDURE — 82607 VITAMIN B-12: CPT

## 2025-08-14 LAB — APO B SERPL-MCNC: 94 MG/DL
